# Patient Record
Sex: FEMALE | Race: WHITE | URBAN - METROPOLITAN AREA
[De-identification: names, ages, dates, MRNs, and addresses within clinical notes are randomized per-mention and may not be internally consistent; named-entity substitution may affect disease eponyms.]

---

## 2020-10-21 PROBLEM — Z00.00 ENCOUNTER FOR PREVENTIVE HEALTH EXAMINATION: Status: ACTIVE | Noted: 2020-10-21

## 2021-05-17 ENCOUNTER — EMERGENCY (EMERGENCY)
Facility: HOSPITAL | Age: 86
LOS: 1 days | Discharge: HOME | End: 2021-05-17
Admitting: EMERGENCY MEDICINE
Payer: MEDICARE

## 2021-05-17 VITALS
WEIGHT: 160.06 LBS | HEART RATE: 68 BPM | DIASTOLIC BLOOD PRESSURE: 75 MMHG | SYSTOLIC BLOOD PRESSURE: 134 MMHG | TEMPERATURE: 98 F | OXYGEN SATURATION: 99 % | RESPIRATION RATE: 18 BRPM

## 2021-05-17 VITALS
SYSTOLIC BLOOD PRESSURE: 128 MMHG | DIASTOLIC BLOOD PRESSURE: 78 MMHG | HEART RATE: 60 BPM | RESPIRATION RATE: 16 BRPM | OXYGEN SATURATION: 98 % | TEMPERATURE: 98 F

## 2021-05-17 DIAGNOSIS — S00.83XA CONTUSION OF OTHER PART OF HEAD, INITIAL ENCOUNTER: ICD-10-CM

## 2021-05-17 DIAGNOSIS — S05.10XA CONTUSION OF EYEBALL AND ORBITAL TISSUES, UNSPECIFIED EYE, INITIAL ENCOUNTER: ICD-10-CM
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
## 2021-05-17 DIAGNOSIS — H57.11 OCULAR PAIN, RIGHT EYE: ICD-10-CM

## 2021-05-17 DIAGNOSIS — S46.911A STRAIN OF UNSPECIFIED MUSCLE, FASCIA AND TENDON AT SHOULDER AND UPPER ARM LEVEL, RIGHT ARM, INITIAL ENCOUNTER: ICD-10-CM

## 2021-05-17 PROCEDURE — 70450 CT HEAD/BRAIN W/O DYE: CPT | Mod: 26,MH

## 2021-05-17 PROCEDURE — 99284 EMERGENCY DEPT VISIT MOD MDM: CPT

## 2021-05-17 PROCEDURE — 73030 X-RAY EXAM OF SHOULDER: CPT | Mod: 26,RT

## 2021-05-17 PROCEDURE — 70486 CT MAXILLOFACIAL W/O DYE: CPT | Mod: 26,MH

## 2021-05-17 RX ORDER — TETANUS TOXOID, REDUCED DIPHTHERIA TOXOID AND ACELLULAR PERTUSSIS VACCINE, ADSORBED 5; 2.5; 8; 8; 2.5 [IU]/.5ML; [IU]/.5ML; UG/.5ML; UG/.5ML; UG/.5ML
0.5 SUSPENSION INTRAMUSCULAR ONCE
Refills: 0 | Status: COMPLETED | OUTPATIENT
Start: 2021-05-17 | End: 2021-05-17

## 2021-05-17 RX ADMIN — TETANUS TOXOID, REDUCED DIPHTHERIA TOXOID AND ACELLULAR PERTUSSIS VACCINE, ADSORBED 0.5 MILLILITER(S): 5; 2.5; 8; 8; 2.5 SUSPENSION INTRAMUSCULAR at 19:11

## 2021-05-17 NOTE — ED PROVIDER NOTE - CARE PROVIDER_API CALL
Mane Royal)  Orthopaedic Surgery  [address], [address] [place] [address]  Phone: [phone]  Fax: [phone]  Follow Up Time: 1-3 Days

## 2021-05-17 NOTE — ED PROVIDER NOTE - OBJECTIVE STATEMENT
87 y/o female with PMH of Restless Leg Syndrome presents to the for evaluation after a mechanical slip and fall outside 3 days ago. She states she tripped over a curb and fell forward, striking her left eyebrow against the pavement. She states she sustained a wound to the eyebrow with localized pain and swelling, as well as pain to her right shoulder. She is unaware of any other associated injuries. Bystanders helped her to her feet, EMS arrived shortly afterward, and the patient declined transport to the ED stating she felt fine otherwise. She bandaged and cleaned her wound herself, and states it appears to be healing well, though has noticed some increased bruising to the area so she decided to come here for further evaluation. She states she has been ambulating well since the incident and has no other associated complaints at this time. She does not recall when her last Tdap was.    Denies LOC, dizziness, confusion, headache, vision changes, neck or back pain, CP, SOB, palpitations, abdo pain, N/V, extremity numbness or weakness

## 2021-05-17 NOTE — ED PROVIDER NOTE - SKIN WOUND TYPE
Superficial abrasion lateral to the left eyebrow which appears to be healing well. No active bleeding. No warmth, swelling, fluctuance, crepitus, streaking or discharge.

## 2021-05-17 NOTE — ED PROVIDER NOTE - NSFOLLOWUPINSTRUCTIONS_ED_ALL_ED_FT
Contusion    A contusion is a deep bruise. Contusions are the result of a blunt injury to tissues and muscle fibers under the skin. The skin overlying the contusion may turn blue, purple, or yellow. Symptoms also include pain and swelling in the injured area.    Strain    A strain is a stretch or tear in one of the muscles in your body. This is caused by an injury to the area such as a twisting mechanism. Symptoms include pain, swelling, or bruising. Rest that area over the next several days and slowly resume activity when tolerated. Ice can help with swelling and pain.     RETURN TO THE ER IMMEDIATELY IF YOU HAVE ANY OF THE FOLLOWING SYMPTOMS: severe pain, numbness, tingling, pain, weakness, or skin color/temperature change in any part of your body distal to the injury.

## 2021-05-17 NOTE — ED PROVIDER NOTE - PATIENT PORTAL LINK FT
You can access the FollowMyHealth Patient Portal offered by [facility] by registering at the following website: [email or web address]. By joining [facility]’s FollowMyHealth portal, you will also be able to view your health information using other applications (apps) compatible with our system.

## 2021-05-17 NOTE — ED PROVIDER NOTE - ENMT, MLM
There is minor swelling with localized ecchymosis and tenderness just lateral to the left eyebrow on the face (See SKIN). Scalp is otherwise NCAT.

## 2021-05-17 NOTE — ED ADULT NURSE NOTE - NSIMPLEMENTINTERV_GEN_ALL_ED
Implemented All Fall with Harm Risk Interventions:  [place] to call system. Call bell, personal items and telephone within reach. Instruct patient to call for assistance. Room bathroom lighting operational. Non-slip footwear when patient is off stretcher. Physically safe environment: no spills, clutter or unnecessary equipment. Stretcher in lowest position, wheels locked, appropriate side rails in place. Provide visual cue, wrist band, yellow gown, etc. Monitor gait and stability. Monitor for mental status changes and reorient to person, place, and time. Review medications for side effects contributing to fall risk. Reinforce activity limits and safety measures with patient and family. Provide visual clues: red socks.

## 2021-05-17 NOTE — ED PROVIDER NOTE - MUSCULOSKELETAL, MLM
Painful ROM of the right shoulder. Non-tender. No obvious swelling, deformity or palpable crepitus. There is no midline tenderness, deformities or step-offs of the spine. Patient ambulates well and without difficulty.

## 2021-05-17 NOTE — ED PROVIDER NOTE - CLINICAL SUMMARY MEDICAL DECISION MAKING FREE TEXT BOX
CT Head, Maxillofacial and Xrays of the shoulder were reviewed with no fracture, dislocation or acute intracranial traumatic findings. Pt is sitting comfortably in NAD with no other complaints at this time. Patient requesting Ortho referral in this building as she lives close by if her shoulder pain continues. Will D/C with supportive care instructions and to F/U with Ortho this week for re-evaluation. Strict return precautions reviewed with pt in which pt verbalizes understanding and agrees to.

## 2025-07-07 ENCOUNTER — INPATIENT (INPATIENT)
Facility: HOSPITAL | Age: 89
LOS: 3 days | Discharge: NURSING, REHAB OR LONG-TERM CARE | End: 2025-07-11
Attending: STUDENT IN AN ORGANIZED HEALTH CARE EDUCATION/TRAINING PROGRAM | Admitting: INTERNAL MEDICINE
Payer: MEDICARE

## 2025-07-07 VITALS
TEMPERATURE: 98 F | OXYGEN SATURATION: 96 % | RESPIRATION RATE: 16 BRPM | WEIGHT: 154.32 LBS | DIASTOLIC BLOOD PRESSURE: 52 MMHG | HEART RATE: 77 BPM | HEIGHT: 64 IN | SYSTOLIC BLOOD PRESSURE: 98 MMHG

## 2025-07-07 LAB
ALBUMIN SERPL ELPH-MCNC: 3.4 G/DL — SIGNIFICANT CHANGE UP (ref 3.4–5)
ALP SERPL-CCNC: 91 U/L — SIGNIFICANT CHANGE UP (ref 40–120)
ALT FLD-CCNC: 23 U/L — SIGNIFICANT CHANGE UP (ref 12–42)
ANION GAP SERPL CALC-SCNC: 5 MMOL/L — LOW (ref 9–16)
AST SERPL-CCNC: 28 U/L — SIGNIFICANT CHANGE UP (ref 15–37)
BASOPHILS # BLD AUTO: 0.02 K/UL — SIGNIFICANT CHANGE UP (ref 0–0.2)
BASOPHILS NFR BLD AUTO: 0.2 % — SIGNIFICANT CHANGE UP (ref 0–2)
BILIRUB SERPL-MCNC: 0.8 MG/DL — SIGNIFICANT CHANGE UP (ref 0.2–1.2)
BUN SERPL-MCNC: 17 MG/DL — SIGNIFICANT CHANGE UP (ref 7–23)
CALCIUM SERPL-MCNC: 9 MG/DL — SIGNIFICANT CHANGE UP (ref 8.5–10.5)
CHLORIDE SERPL-SCNC: 96 MMOL/L — SIGNIFICANT CHANGE UP (ref 96–108)
CO2 SERPL-SCNC: 33 MMOL/L — HIGH (ref 22–31)
CREAT SERPL-MCNC: 0.65 MG/DL — SIGNIFICANT CHANGE UP (ref 0.5–1.3)
EGFR: 84 ML/MIN/1.73M2 — SIGNIFICANT CHANGE UP
EGFR: 84 ML/MIN/1.73M2 — SIGNIFICANT CHANGE UP
EOSINOPHIL # BLD AUTO: 0.16 K/UL — SIGNIFICANT CHANGE UP (ref 0–0.5)
EOSINOPHIL NFR BLD AUTO: 1.5 % — SIGNIFICANT CHANGE UP (ref 0–6)
GLUCOSE SERPL-MCNC: 171 MG/DL — HIGH (ref 70–99)
HCT VFR BLD CALC: 37.4 % — SIGNIFICANT CHANGE UP (ref 34.5–45)
HGB BLD-MCNC: 12 G/DL — SIGNIFICANT CHANGE UP (ref 11.5–15.5)
IMM GRANULOCYTES # BLD AUTO: 0.06 K/UL — SIGNIFICANT CHANGE UP (ref 0–0.07)
IMM GRANULOCYTES NFR BLD AUTO: 0.6 % — SIGNIFICANT CHANGE UP (ref 0–0.9)
LACTATE BLDV-MCNC: 1.4 MMOL/L — SIGNIFICANT CHANGE UP (ref 0.5–2)
LYMPHOCYTES # BLD AUTO: 0.54 K/UL — LOW (ref 1–3.3)
LYMPHOCYTES NFR BLD AUTO: 5.1 % — LOW (ref 13–44)
MCHC RBC-ENTMCNC: 27.8 PG — SIGNIFICANT CHANGE UP (ref 27–34)
MCHC RBC-ENTMCNC: 32.1 G/DL — SIGNIFICANT CHANGE UP (ref 32–36)
MCV RBC AUTO: 86.6 FL — SIGNIFICANT CHANGE UP (ref 80–100)
MONOCYTES # BLD AUTO: 1.11 K/UL — HIGH (ref 0–0.9)
MONOCYTES NFR BLD AUTO: 10.5 % — SIGNIFICANT CHANGE UP (ref 2–14)
NEUTROPHILS # BLD AUTO: 8.7 K/UL — HIGH (ref 1.8–7.4)
NEUTROPHILS NFR BLD AUTO: 82.1 % — HIGH (ref 43–77)
NRBC # BLD AUTO: 0 K/UL — SIGNIFICANT CHANGE UP (ref 0–0)
NRBC # FLD: 0 K/UL — SIGNIFICANT CHANGE UP (ref 0–0)
NRBC BLD AUTO-RTO: 0 /100 WBCS — SIGNIFICANT CHANGE UP (ref 0–0)
PCO2 BLDV: 55 MMHG — HIGH (ref 39–42)
PH BLDV: 7.39 — SIGNIFICANT CHANGE UP (ref 7.32–7.43)
PLATELET # BLD AUTO: 230 K/UL — SIGNIFICANT CHANGE UP (ref 150–400)
PMV BLD: 10.7 FL — SIGNIFICANT CHANGE UP (ref 7–13)
PO2 BLDV: <35 MMHG — LOW (ref 25–45)
POTASSIUM SERPL-MCNC: 4.3 MMOL/L — SIGNIFICANT CHANGE UP (ref 3.5–5.3)
POTASSIUM SERPL-SCNC: 4.3 MMOL/L — SIGNIFICANT CHANGE UP (ref 3.5–5.3)
PROT SERPL-MCNC: 8.1 G/DL — SIGNIFICANT CHANGE UP (ref 6.4–8.2)
RBC # BLD: 4.32 M/UL — SIGNIFICANT CHANGE UP (ref 3.8–5.2)
RBC # FLD: 15.6 % — HIGH (ref 10.3–14.5)
SAO2 % BLDV: 29.6 % — LOW (ref 67–88)
SODIUM SERPL-SCNC: 134 MMOL/L — SIGNIFICANT CHANGE UP (ref 132–145)
WBC # BLD: 10.59 K/UL — HIGH (ref 3.8–10.5)
WBC # FLD AUTO: 10.59 K/UL — HIGH (ref 3.8–10.5)

## 2025-07-07 PROCEDURE — 99285 EMERGENCY DEPT VISIT HI MDM: CPT | Mod: FS

## 2025-07-07 PROCEDURE — 73701 CT LOWER EXTREMITY W/DYE: CPT | Mod: 26,LT

## 2025-07-07 RX ORDER — VANCOMYCIN HCL IN 5 % DEXTROSE 1.5G/250ML
1000 PLASTIC BAG, INJECTION (ML) INTRAVENOUS ONCE
Refills: 0 | Status: COMPLETED | OUTPATIENT
Start: 2025-07-07 | End: 2025-07-07

## 2025-07-07 RX ORDER — PIPERACILLIN-TAZO-DEXTROSE,ISO 3.375G/5
3.38 IV SOLUTION, PIGGYBACK PREMIX FROZEN(ML) INTRAVENOUS ONCE
Refills: 0 | Status: COMPLETED | OUTPATIENT
Start: 2025-07-07 | End: 2025-07-07

## 2025-07-07 RX ADMIN — Medication 500 MILLILITER(S): at 21:48

## 2025-07-07 RX ADMIN — Medication 200 GRAM(S): at 22:55

## 2025-07-07 RX ADMIN — Medication 250 MILLIGRAM(S): at 19:47

## 2025-07-07 NOTE — ED ADULT TRIAGE NOTE - CHIEF COMPLAINT QUOTE
pt here left great toe pain; as per HHA been bleeding for 2 days after banging nail against table; dried blood noted; city MD concerned for maggots and infection

## 2025-07-07 NOTE — ED PROVIDER NOTE - ATTENDING APP SHARED VISIT CONTRIBUTION OF CARE
I personally examined the patient and discussed plan of care with JUSTINA including diagnostic testing, treatment, and disposition.

## 2025-07-07 NOTE — ED PROVIDER NOTE - CLINICAL SUMMARY MEDICAL DECISION MAKING FREE TEXT BOX
89yo F with DM, HLD, s/p pacemaker, diabetic neuropathy, c/o infection to left big toe with maggot on it for at least a month. Pt states that she is not able to walk as she has decreased feeling to her feet, and arthritis to both knees. Pt does not remember when the trauma happened. Denies fever.  Exam found pt sitting in chair, left 1st toe has maggot underneath toenail; erythema and edema on bot feet till mid tib/fib.  concern for cellulitis. will get CT foot to mid tib/fib to r/o osteomyelitis, abscess although low suspicion. start vancomycin and fluids.

## 2025-07-07 NOTE — ED ADULT NURSE REASSESSMENT NOTE - NS ED NURSE REASSESS COMMENT FT1
received patient from previous RN. patient resting comfortably in stretcher, no signs of distress noted. pending admission

## 2025-07-07 NOTE — ED PROVIDER NOTE - PHYSICAL EXAMINATION
CONSTITUTIONAL: Well-appearing  NEURO: Alert & oriented. Sensory and motor functions are grossly intact.  PSYCH: Mood appropriate. Thought processes intact.   NECK: Supple  CARD: Regular rate and rhythm, no murmurs  RESP: No accessory muscle use; breath sounds clear and equal bilaterally; no wheezes, rhonchi, or rales     ABD: Soft; non-distended; non-tender.   MUSCULOSKELETAL/EXTREMITIES: left 1st toe has maggot underneath toenail; erythema and edema on bot feet till mid tib/fib.

## 2025-07-07 NOTE — ED PROVIDER NOTE - OBJECTIVE STATEMENT
89yo F with DM, HLD, s/p pacemaker, diabetic neuropathy, c/o infection to left big toe with maggot on it for at least a month. Pt states that she is not able to walk as she has decreased feeling to her feet, and arthritis to both knees. Pt does not remember when the trauma happened. Denies fever. 89yo F with DM, HLD, s/p pacemaker, diabetic neuropathy, c/o infection to left big toe with maggot on it for at least a month. Pt states that she is not able to walk as she has decreased feeling to her feet, and arthritis to both knees. Pt does not remember when the trauma happened. Denies fever. Pt has HHA coverage for 8 hours per day and also pays for other hours. Per aid/friend who accompanied pt to the ER, she does not know when this condition starts as she goes to visit pt once a week; she usually takes over from other aid who sees her once a week as well.

## 2025-07-08 DIAGNOSIS — N32.81 OVERACTIVE BLADDER: ICD-10-CM

## 2025-07-08 DIAGNOSIS — Z95.0 PRESENCE OF CARDIAC PACEMAKER: Chronic | ICD-10-CM

## 2025-07-08 DIAGNOSIS — L03.032 CELLULITIS OF LEFT TOE: ICD-10-CM
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
## 2025-07-08 DIAGNOSIS — E78.5 HYPERLIPIDEMIA, UNSPECIFIED: ICD-10-CM

## 2025-07-08 DIAGNOSIS — Z29.9 ENCOUNTER FOR PROPHYLACTIC MEASURES, UNSPECIFIED: ICD-10-CM

## 2025-07-08 DIAGNOSIS — E11.9 TYPE 2 DIABETES MELLITUS WITHOUT COMPLICATIONS: ICD-10-CM

## 2025-07-08 DIAGNOSIS — F32.9 MAJOR DEPRESSIVE DISORDER, SINGLE EPISODE, UNSPECIFIED: ICD-10-CM

## 2025-07-08 DIAGNOSIS — G62.9 POLYNEUROPATHY, UNSPECIFIED: ICD-10-CM

## 2025-07-08 DIAGNOSIS — B87.9 MYIASIS, UNSPECIFIED: ICD-10-CM

## 2025-07-08 PROBLEM — G25.81 RESTLESS LEGS SYNDROME: Chronic | Status: ACTIVE | Noted: 2021-05-17

## 2025-07-08 LAB
ADD ON TEST-SPECIMEN IN LAB: SIGNIFICANT CHANGE UP
ANION GAP SERPL CALC-SCNC: 10 MMOL/L — SIGNIFICANT CHANGE UP (ref 5–17)
APPEARANCE UR: CLEAR — SIGNIFICANT CHANGE UP
BASOPHILS # BLD AUTO: 0.02 K/UL — SIGNIFICANT CHANGE UP (ref 0–0.2)
BASOPHILS NFR BLD AUTO: 0.2 % — SIGNIFICANT CHANGE UP (ref 0–2)
BILIRUB UR-MCNC: NEGATIVE — SIGNIFICANT CHANGE UP
BUN SERPL-MCNC: 12 MG/DL — SIGNIFICANT CHANGE UP (ref 7–23)
CALCIUM SERPL-MCNC: 8.7 MG/DL — SIGNIFICANT CHANGE UP (ref 8.4–10.5)
CHLORIDE SERPL-SCNC: 98 MMOL/L — SIGNIFICANT CHANGE UP (ref 96–108)
CO2 SERPL-SCNC: 24 MMOL/L — SIGNIFICANT CHANGE UP (ref 22–31)
COLOR SPEC: YELLOW — SIGNIFICANT CHANGE UP
CREAT SERPL-MCNC: 0.46 MG/DL — LOW (ref 0.5–1.3)
CRP SERPL-MCNC: 29.1 MG/L — HIGH (ref 0–4)
DIFF PNL FLD: NEGATIVE — SIGNIFICANT CHANGE UP
E COLI DNA BLD POS QL NAA+NON-PROBE: SIGNIFICANT CHANGE UP
EGFR: 91 ML/MIN/1.73M2 — SIGNIFICANT CHANGE UP
EGFR: 91 ML/MIN/1.73M2 — SIGNIFICANT CHANGE UP
EOSINOPHIL # BLD AUTO: 0.06 K/UL — SIGNIFICANT CHANGE UP (ref 0–0.5)
EOSINOPHIL NFR BLD AUTO: 0.6 % — SIGNIFICANT CHANGE UP (ref 0–6)
ERYTHROCYTE [SEDIMENTATION RATE] IN BLOOD: 41 MM/HR — HIGH (ref 0–20)
GLUCOSE SERPL-MCNC: 116 MG/DL — HIGH (ref 70–99)
GLUCOSE UR QL: NEGATIVE MG/DL — SIGNIFICANT CHANGE UP
GRAM STN FLD: ABNORMAL
GRAM STN FLD: SIGNIFICANT CHANGE UP
HCT VFR BLD CALC: 33 % — LOW (ref 34.5–45)
HGB BLD-MCNC: 10.6 G/DL — LOW (ref 11.5–15.5)
IMM GRANULOCYTES # BLD AUTO: 0.05 K/UL — SIGNIFICANT CHANGE UP (ref 0–0.07)
IMM GRANULOCYTES NFR BLD AUTO: 0.5 % — SIGNIFICANT CHANGE UP (ref 0–0.9)
KETONES UR QL: NEGATIVE MG/DL — SIGNIFICANT CHANGE UP
LEUKOCYTE ESTERASE UR-ACNC: NEGATIVE — SIGNIFICANT CHANGE UP
LYMPHOCYTES # BLD AUTO: 0.78 K/UL — LOW (ref 1–3.3)
LYMPHOCYTES NFR BLD AUTO: 7.4 % — LOW (ref 13–44)
MAGNESIUM SERPL-MCNC: 1.9 MG/DL — SIGNIFICANT CHANGE UP (ref 1.6–2.6)
MCHC RBC-ENTMCNC: 28.1 PG — SIGNIFICANT CHANGE UP (ref 27–34)
MCHC RBC-ENTMCNC: 32.1 G/DL — SIGNIFICANT CHANGE UP (ref 32–36)
MCV RBC AUTO: 87.5 FL — SIGNIFICANT CHANGE UP (ref 80–100)
METHOD TYPE: SIGNIFICANT CHANGE UP
MONOCYTES # BLD AUTO: 1.31 K/UL — HIGH (ref 0–0.9)
MONOCYTES NFR BLD AUTO: 12.5 % — SIGNIFICANT CHANGE UP (ref 2–14)
NEUTROPHILS # BLD AUTO: 8.25 K/UL — HIGH (ref 1.8–7.4)
NEUTROPHILS NFR BLD AUTO: 78.8 % — HIGH (ref 43–77)
NITRITE UR-MCNC: NEGATIVE — SIGNIFICANT CHANGE UP
NRBC # BLD AUTO: 0 K/UL — SIGNIFICANT CHANGE UP (ref 0–0)
NRBC # FLD: 0 K/UL — SIGNIFICANT CHANGE UP (ref 0–0)
NRBC BLD AUTO-RTO: 0 /100 WBCS — SIGNIFICANT CHANGE UP (ref 0–0)
PH UR: 7 — SIGNIFICANT CHANGE UP (ref 5–8)
PLATELET # BLD AUTO: 223 K/UL — SIGNIFICANT CHANGE UP (ref 150–400)
PMV BLD: 11.4 FL — SIGNIFICANT CHANGE UP (ref 7–13)
POTASSIUM SERPL-MCNC: 4.1 MMOL/L — SIGNIFICANT CHANGE UP (ref 3.5–5.3)
POTASSIUM SERPL-SCNC: 4.1 MMOL/L — SIGNIFICANT CHANGE UP (ref 3.5–5.3)
PROT UR-MCNC: NEGATIVE MG/DL — SIGNIFICANT CHANGE UP
RBC # BLD: 3.77 M/UL — LOW (ref 3.8–5.2)
RBC # FLD: 15.8 % — HIGH (ref 10.3–14.5)
SODIUM SERPL-SCNC: 132 MMOL/L — LOW (ref 135–145)
SP GR SPEC: 1.02 — SIGNIFICANT CHANGE UP (ref 1–1.03)
SPECIMEN SOURCE: SIGNIFICANT CHANGE UP
SPECIMEN SOURCE: SIGNIFICANT CHANGE UP
UROBILINOGEN FLD QL: 2 MG/DL (ref 0.2–1)
WBC # BLD: 10.47 K/UL — SIGNIFICANT CHANGE UP (ref 3.8–10.5)
WBC # FLD AUTO: 10.47 K/UL — SIGNIFICANT CHANGE UP (ref 3.8–10.5)

## 2025-07-08 PROCEDURE — 82803 BLOOD GASES ANY COMBINATION: CPT

## 2025-07-08 PROCEDURE — 87150 DNA/RNA AMPLIFIED PROBE: CPT

## 2025-07-08 PROCEDURE — 36415 COLL VENOUS BLD VENIPUNCTURE: CPT

## 2025-07-08 PROCEDURE — 82962 GLUCOSE BLOOD TEST: CPT

## 2025-07-08 PROCEDURE — 83735 ASSAY OF MAGNESIUM: CPT

## 2025-07-08 PROCEDURE — 83605 ASSAY OF LACTIC ACID: CPT

## 2025-07-08 PROCEDURE — 81003 URINALYSIS AUTO W/O SCOPE: CPT

## 2025-07-08 PROCEDURE — 87205 SMEAR GRAM STAIN: CPT

## 2025-07-08 PROCEDURE — 84145 PROCALCITONIN (PCT): CPT

## 2025-07-08 PROCEDURE — 85025 COMPLETE CBC W/AUTO DIFF WBC: CPT

## 2025-07-08 PROCEDURE — 99223 1ST HOSP IP/OBS HIGH 75: CPT | Mod: GC

## 2025-07-08 PROCEDURE — 87040 BLOOD CULTURE FOR BACTERIA: CPT

## 2025-07-08 PROCEDURE — 73070 X-RAY EXAM OF ELBOW: CPT | Mod: 26,RT

## 2025-07-08 PROCEDURE — 86140 C-REACTIVE PROTEIN: CPT

## 2025-07-08 PROCEDURE — 87075 CULTR BACTERIA EXCEPT BLOOD: CPT

## 2025-07-08 PROCEDURE — 85652 RBC SED RATE AUTOMATED: CPT

## 2025-07-08 PROCEDURE — 73030 X-RAY EXAM OF SHOULDER: CPT | Mod: 26,RT

## 2025-07-08 PROCEDURE — 80048 BASIC METABOLIC PNL TOTAL CA: CPT

## 2025-07-08 PROCEDURE — 87070 CULTURE OTHR SPECIMN AEROBIC: CPT

## 2025-07-08 PROCEDURE — 80053 COMPREHEN METABOLIC PANEL: CPT

## 2025-07-08 RX ORDER — B1/B2/B3/B5/B6/B12/VIT C/FOLIC 500-0.5 MG
1 TABLET ORAL DAILY
Refills: 0 | Status: DISCONTINUED | OUTPATIENT
Start: 2025-07-08 | End: 2025-07-11

## 2025-07-08 RX ORDER — CEFAZOLIN SODIUM IN 0.9 % NACL 3 G/100 ML
2000 INTRAVENOUS SOLUTION, PIGGYBACK (ML) INTRAVENOUS EVERY 8 HOURS
Refills: 0 | Status: DISCONTINUED | OUTPATIENT
Start: 2025-07-08 | End: 2025-07-08

## 2025-07-08 RX ORDER — GABAPENTIN 400 MG/1
1 CAPSULE ORAL
Refills: 0 | DISCHARGE

## 2025-07-08 RX ORDER — REPAGLINIDE 1 MG/1
1 TABLET ORAL
Refills: 0 | DISCHARGE

## 2025-07-08 RX ORDER — SENNA 187 MG
2 TABLET ORAL AT BEDTIME
Refills: 0 | Status: DISCONTINUED | OUTPATIENT
Start: 2025-07-08 | End: 2025-07-11

## 2025-07-08 RX ORDER — INSULIN LISPRO 100 U/ML
INJECTION, SOLUTION INTRAVENOUS; SUBCUTANEOUS
Refills: 0 | Status: DISCONTINUED | OUTPATIENT
Start: 2025-07-08 | End: 2025-07-11

## 2025-07-08 RX ORDER — PIPERACILLIN-TAZO-DEXTROSE,ISO 3.375G/5
3.38 IV SOLUTION, PIGGYBACK PREMIX FROZEN(ML) INTRAVENOUS EVERY 8 HOURS
Refills: 0 | Status: DISCONTINUED | OUTPATIENT
Start: 2025-07-08 | End: 2025-07-10

## 2025-07-08 RX ORDER — MIRABEGRON 50 MG/1
1 TABLET, FILM COATED, EXTENDED RELEASE ORAL
Refills: 0 | DISCHARGE

## 2025-07-08 RX ORDER — GABAPENTIN 400 MG/1
100 CAPSULE ORAL DAILY
Refills: 0 | Status: DISCONTINUED | OUTPATIENT
Start: 2025-07-08 | End: 2025-07-11

## 2025-07-08 RX ORDER — ATORVASTATIN CALCIUM 80 MG/1
10 TABLET, FILM COATED ORAL AT BEDTIME
Refills: 0 | Status: DISCONTINUED | OUTPATIENT
Start: 2025-07-08 | End: 2025-07-11

## 2025-07-08 RX ORDER — POLYETHYLENE GLYCOL 3350 17 G/17G
17 POWDER, FOR SOLUTION ORAL EVERY 12 HOURS
Refills: 0 | Status: DISCONTINUED | OUTPATIENT
Start: 2025-07-08 | End: 2025-07-11

## 2025-07-08 RX ORDER — ESCITALOPRAM OXALATE 20 MG/1
20 TABLET ORAL DAILY
Refills: 0 | Status: DISCONTINUED | OUTPATIENT
Start: 2025-07-08 | End: 2025-07-11

## 2025-07-08 RX ORDER — ENOXAPARIN SODIUM 100 MG/ML
40 INJECTION SUBCUTANEOUS EVERY 24 HOURS
Refills: 0 | Status: DISCONTINUED | OUTPATIENT
Start: 2025-07-08 | End: 2025-07-11

## 2025-07-08 RX ORDER — NYSTATIN 100000 [USP'U]/G
1 CREAM TOPICAL
Refills: 0 | Status: DISCONTINUED | OUTPATIENT
Start: 2025-07-08 | End: 2025-07-11

## 2025-07-08 RX ORDER — GABAPENTIN 400 MG/1
200 CAPSULE ORAL
Refills: 0 | Status: DISCONTINUED | OUTPATIENT
Start: 2025-07-08 | End: 2025-07-11

## 2025-07-08 RX ORDER — ESCITALOPRAM OXALATE 20 MG/1
1 TABLET ORAL
Refills: 0 | DISCHARGE

## 2025-07-08 RX ADMIN — ATORVASTATIN CALCIUM 10 MILLIGRAM(S): 80 TABLET, FILM COATED ORAL at 21:49

## 2025-07-08 RX ADMIN — INSULIN LISPRO 2: 100 INJECTION, SOLUTION INTRAVENOUS; SUBCUTANEOUS at 22:35

## 2025-07-08 RX ADMIN — GABAPENTIN 100 MILLIGRAM(S): 400 CAPSULE ORAL at 13:03

## 2025-07-08 RX ADMIN — GABAPENTIN 200 MILLIGRAM(S): 400 CAPSULE ORAL at 18:31

## 2025-07-08 RX ADMIN — Medication 1 TABLET(S): at 13:04

## 2025-07-08 RX ADMIN — NYSTATIN 1 APPLICATION(S): 100000 CREAM TOPICAL at 06:35

## 2025-07-08 RX ADMIN — Medication 2 TABLET(S): at 21:49

## 2025-07-08 RX ADMIN — ESCITALOPRAM OXALATE 20 MILLIGRAM(S): 20 TABLET ORAL at 06:36

## 2025-07-08 RX ADMIN — POLYETHYLENE GLYCOL 3350 17 GRAM(S): 17 POWDER, FOR SOLUTION ORAL at 06:35

## 2025-07-08 RX ADMIN — INSULIN LISPRO 2: 100 INJECTION, SOLUTION INTRAVENOUS; SUBCUTANEOUS at 18:30

## 2025-07-08 RX ADMIN — NYSTATIN 1 APPLICATION(S): 100000 CREAM TOPICAL at 18:32

## 2025-07-08 RX ADMIN — Medication 1 TABLET(S): at 06:35

## 2025-07-08 RX ADMIN — ESCITALOPRAM OXALATE 20 MILLIGRAM(S): 20 TABLET ORAL at 15:41

## 2025-07-08 RX ADMIN — ENOXAPARIN SODIUM 40 MILLIGRAM(S): 100 INJECTION SUBCUTANEOUS at 06:37

## 2025-07-08 RX ADMIN — GABAPENTIN 200 MILLIGRAM(S): 400 CAPSULE ORAL at 06:35

## 2025-07-08 RX ADMIN — Medication 100 MILLIGRAM(S): at 06:38

## 2025-07-08 RX ADMIN — Medication 25 GRAM(S): at 17:02

## 2025-07-08 NOTE — H&P ADULT - PROBLEM SELECTOR PLAN 9
Fluids: S/P 500cc NS, encourage PO  Electrolytes: Replete PRN  Diet: CC  DVT: Lovenox  Dispo: [facility]  Code: DNR/DNI

## 2025-07-08 NOTE — PHYSICAL THERAPY INITIAL EVALUATION ADULT - PERTINENT HX OF CURRENT PROBLEM, REHAB EVAL
89yo F with DM, depression, HLD, s/p pacemaker, diabetic neuropathy presented to the ED with a chronic left great toe wound infested with maggots, present for at least one month. Also reported a recent fall with right arm pain and decreased range of motion, though prior outpatient imaging showed no fracture or dislocation.

## 2025-07-08 NOTE — H&P ADULT - PROBLEM SELECTOR PLAN 3
Pt reporting trip and fall 1 week ago at home, denies headstrike/LOC. Felt a pop in her R shoulder with decreased ROM following fall.  R shoulder XR from 5/17: No acute abnormality visualized. Moderate osteoarthritis.    -Repeat shoulder XR  -PT/OT

## 2025-07-08 NOTE — PROGRESS NOTE ADULT - SUBJECTIVE AND OBJECTIVE BOX
INTERVAL EVENTS: no significant overnight events.     SUBJECTIVE: No additional complaints or concerns reported by patient.   No cough, chest pain, fevers/chills, shortness of breath. ROS negative otherwise.     Vital Signs Last 24 Hrs  T(C): 36.9 (08 Jul 2025 03:33), Max: 37.6 (07 Jul 2025 18:34)  T(F): 98.4 (08 Jul 2025 03:33), Max: 99.6 (07 Jul 2025 18:34)  HR: 93 (08 Jul 2025 03:33) (77 - 99)  BP: 116/71 (08 Jul 2025 03:33) (98/52 - 119/67)  BP(mean): 86 (08 Jul 2025 03:33) (86 - 86)  RR: 18 (08 Jul 2025 03:33) (16 - 18)  SpO2: 94% (08 Jul 2025 03:33) (94% - 97%)    Parameters below as of 08 Jul 2025 03:33  Patient On (Oxygen Delivery Method): room air    Orthostatic VS  I&O's Summary      Exam  GENERAL/NEURO Awake, alert, NCAT, following commands, CN grossly intact  ENT: MMM, PERRL  CARDIOVASCULAR: RRR, no murmur  RESPIRATORY: No wheeze/rhonchi/crackles, no accessory muscle use  ABDOMEN: Abdomen soft, NT/ND, bowel sounds x4  SKIN/EXTREMETIES: No rashes, warm, no edema over lower extremeties. Able to move all 4 extremeties, DP intact, reflexes grossly normal.                          12.0   10.59 )-----------( 230      ( 07 Jul 2025 19:33 )             37.4   07-07    134  |  96  |  17  ----------------------------<  171[H]  4.3   |  33[H]  |  0.65    Ca    9.0      07 Jul 2025 19:33    TPro  8.1  /  Alb  3.4  /  TBili  0.8  /  DBili  x   /  AST  28  /  ALT  23  /  AlkPhos  91  07-07      Allergies    No Known Allergies    Intolerances     Home Medications:  escitalopram 20 mg oral tablet: 1 tab(s) orally once a day (08 Jul 2025 04:13)  Fluticasone:  (08 Jul 2025 04:13)  gabapentin 100 mg oral capsule: 1 cap(s) orally 3 times a day (08 Jul 2025 04:13)  metformin-sitagliptin:  (08 Jul 2025 04:13)  mirabegron 50 mg oral tablet, extended release: 1 tab(s) orally once a day (08 Jul 2025 04:13)  Repaglinide: 1 milligram(s) once a day (08 Jul 2025 04:13)  simvastatin 20 mg oral tablet: 1 tab(s) orally once a day (at bedtime) (08 Jul 2025 04:13)  MEDICATIONS  (STANDING):  atorvastatin 10 milliGRAM(s) Oral at bedtime  ceFAZolin   IVPB 2000 milliGRAM(s) IV Intermittent every 8 hours  enoxaparin Injectable 40 milliGRAM(s) SubCutaneous every 24 hours  escitalopram 20 milliGRAM(s) Oral daily  gabapentin 200 milliGRAM(s) Oral <User Schedule>  gabapentin 100 milliGRAM(s) Oral daily  insulin lispro (ADMELOG) corrective regimen sliding scale   SubCutaneous three times a day before meals  multivitamin 1 Tablet(s) Oral daily  nystatin Powder 1 Application(s) Topical two times a day  polyethylene glycol 3350 17 Gram(s) Oral every 12 hours  senna 2 Tablet(s) Oral at bedtime    MEDICATIONS  (PRN):  Activity - Ambulate with Assistance:     Time/Priority:  Routine (07-08-25 @ 04:46) [Active]  Diet, Consistent Carbohydrate/No Snacks (07-08-25 @ 04:46) [Active]       INTERVAL EVENTS: no significant overnight events.     SUBJECTIVE: Patient endorses getting some sleep. Denies pain. Reports being able to urinate without discomfort. She has not has a bowel movement since admission.    Vital Signs Last 24 Hrs  T(C): 36.9 (08 Jul 2025 03:33), Max: 37.6 (07 Jul 2025 18:34)  T(F): 98.4 (08 Jul 2025 03:33), Max: 99.6 (07 Jul 2025 18:34)  HR: 93 (08 Jul 2025 03:33) (77 - 99)  BP: 116/71 (08 Jul 2025 03:33) (98/52 - 119/67)  BP(mean): 86 (08 Jul 2025 03:33) (86 - 86)  RR: 18 (08 Jul 2025 03:33) (16 - 18)  SpO2: 94% (08 Jul 2025 03:33) (94% - 97%)      Exam  GENERAL/NEURO: Tired appearing, sitting in bed with slumped posture. Awake, alert, NCAT, following commands, CN grossly intact  ENT: MMM, PERRL  CARDIOVASCULAR: RRR, no murmur  RESPIRATORY: No wheeze/rhonchi/crackles, no accessory muscle use  ABDOMEN: Abdomen soft, NT/ND, +bowel sounds  SKIN/EXTREMETIES: Gauzed bandage of the left big toe. 3+ pitting edema of lower extremities bilaterally.                             10.6   10.47 )-----------( 223      ( 08 Jul 2025 05:30 )             33.0     07-08    132[L]  |  98  |  12  ----------------------------<  116[H]  4.1   |  24  |  0.46[L]    Ca    8.7      08 Jul 2025 05:30  Mg     1.9     07-08    TPro  8.1  /  Alb  3.4  /  TBili  0.8  /  DBili  x   /  AST  28  /  ALT  23  /  AlkPhos  91  07-07    POCT Blood Glucose (07.08.25 @ 12:53)   POCT Blood Glucose.: 144 mg/Rodri-Reactive Protein (07.08.25 @ 12:31)   C-Reactive Protein: 29.1 mg/LSedimentation Rate, Erythrocyte (07.08.25 @ 12:31)   Sedimentation Rate, Erythrocyte: 41 mm/hrPOCT Blood Glucose (07.08.25 @ 09:02)   POCT Blood Glucose.: 131: Notified RN mg/dLMagnesium in AM (07.08.25 @ 05:30)   Magnesium: 1.9: For pregnant women undergoing magnesium therapy (MgSO4), the therapeutic   range is 5.0-9.0 mg/dL. mg/dLBasic Metabolic Panel in AM (07.08.25 @ 05:30)   Sodium: 132 mmol/L  Potassium: 4.1 mmol/L  Chloride: 98 mmol/L  Carbon Dioxide: 24 mmol/L  Anion Gap: 10 mmol/L  Blood Urea Nitrogen: 12 mg/dL  Creatinine: 0.46 mg/dL  Glucose: 116 mg/dL  Calcium: 8.7 mg/dL  eGFR: 91Culture - Blood (07.07.25 @ 19:34)   Gram Stain:   Growth in aerobic bottle: Gram Negative Rods  Specimen Source: Blood Blood-Peripheral  Culture Results:   Growth in aerobic bottle: Gram Negative Rods   Direct identification is available within approximately 3-5   hours either by Blood Panel Multiplexed PCR or Direct   MALDI-TOF. Details: [email or web address]         Allergies    No Known Allergies    Intolerances     Home Medications:  escitalopram 20 mg oral tablet: 1 tab(s) orally once a day (08 Jul 2025 04:13)  Fluticasone:  (08 Jul 2025 04:13)  gabapentin 100 mg oral capsule: 1 cap(s) orally 3 times a day (08 Jul 2025 04:13)  metformin-sitagliptin:  (08 Jul 2025 04:13)  mirabegron 50 mg oral tablet, extended release: 1 tab(s) orally once a day (08 Jul 2025 04:13)  Repaglinide: 1 milligram(s) once a day (08 Jul 2025 04:13)  simvastatin 20 mg oral tablet: 1 tab(s) orally once a day (at bedtime) (08 Jul 2025 04:13)  MEDICATIONS  (STANDING):  atorvastatin 10 milliGRAM(s) Oral at bedtime  ceFAZolin   IVPB 2000 milliGRAM(s) IV Intermittent every 8 hours  enoxaparin Injectable 40 milliGRAM(s) SubCutaneous every 24 hours  escitalopram 20 milliGRAM(s) Oral daily  gabapentin 200 milliGRAM(s) Oral <User Schedule>  gabapentin 100 milliGRAM(s) Oral daily  insulin lispro (ADMELOG) corrective regimen sliding scale   SubCutaneous three times a day before meals  multivitamin 1 Tablet(s) Oral daily  nystatin Powder 1 Application(s) Topical two times a day  polyethylene glycol 3350 17 Gram(s) Oral every 12 hours  senna 2 Tablet(s) Oral at bedtime    MEDICATIONS  (PRN):  Activity - Ambulate with Assistance:     Time/Priority:  Routine (07-08-25 @ 04:46) [Active]  Diet, Consistent Carbohydrate/No Snacks (07-08-25 @ 04:46) [Active]       Hospital Course:   89yo F with DM, depression, HLD, s/p pacemaker, diabetic neuropathy, c/o infection to left big toe with maggot on it for at least a month. She is unsure when the wound first started. Noted to have L great toe nail avulsion with maggots present in wound. She lived alone up until 1 month ago, but has HHA now 10a-6p daily. Reports she has had increasing difficulty ambulating around her apartment, had a fall 1 wk ago and felt a pop in her right arm, has had decreased ROM since the fall. Reports she had outpatient imaging done without fx or dislocation. No headstrike/LOC. Otherwise denies any fevers, chills, N/V/D, abdominal pain, chest pain or SOB.   has received Vancomycine i89yo F with DM, depression, HLD, s/p pacemaker, diabetic neuropathy, c/o infection to left big toe with maggot on it for at least a month. She is unsure when the wound first started. Noted to have L great toe nail avulsion with maggots present in wound. She lived alone up until 1 month ago, but has HHA now 10a-6p daily. Reports she has had increasing difficulty ambulating around her apartment, had a fall 1 wk ago and felt a pop in her right arm, has had decreased ROM since the fall. Reports she had outpatient imaging done without fx or dislocation. No headstrike/LOC. Otherwise denies any fevers, chills, N/V/D, abdominal pain, chest pain or SOB.   Bx: E Coli. CT LLE with IV contrast: No evidence of osteomyelitis and she is currently on Zosyn, Nystatin powder.   INTERVAL EVENTS: no significant overnight events.     SUBJECTIVE: Patient endorses getting some sleep. Denies pain. Reports being able to urinate without discomfort. She has not has a bowel movement since admission.    Vital Signs Last 24 Hrs  T(C): 36.9 (08 Jul 2025 03:33), Max: 37.6 (07 Jul 2025 18:34)  T(F): 98.4 (08 Jul 2025 03:33), Max: 99.6 (07 Jul 2025 18:34)  HR: 93 (08 Jul 2025 03:33) (77 - 99)  BP: 116/71 (08 Jul 2025 03:33) (98/52 - 119/67)  BP(mean): 86 (08 Jul 2025 03:33) (86 - 86)  RR: 18 (08 Jul 2025 03:33) (16 - 18)  SpO2: 94% (08 Jul 2025 03:33) (94% - 97%)    Exam:  GENERAL/NEURO: Tired appearing, sitting in bed with slumped posture. Awake, alert, NCAT, following commands, CN grossly intact  ENT: MMM, PERRL  CARDIOVASCULAR: RRR, no murmur  RESPIRATORY: No wheeze/rhonchi/crackles, no accessory muscle use  ABDOMEN: Abdomen soft, NT/ND, +bowel sounds  SKIN/EXTREMETIES: Gauzed bandage of the left big toe. 3+ pitting edema of lower extremities bilaterally.                             10.6   10.47 )-----------( 223      ( 08 Jul 2025 05:30 )             33.0     07-08    132[L]  |  98  |  12  ----------------------------<  116[H]  4.1   |  24  |  0.46[L]    Ca    8.7      08 Jul 2025 05:30  Mg     1.9     07-08    TPro  8.1  /  Alb  3.4  /  TBili  0.8  /  DBili  x   /  AST  28  /  ALT  23  /  AlkPhos  91  07-07    POCT Blood Glucose (07.08.25 @ 12:53)   POCT Blood Glucose.: 144 mg/Rodri-Reactive Protein (07.08.25 @ 12:31)   C-Reactive Protein: 29.1 mg/LSedimentation Rate, Erythrocyte (07.08.25 @ 12:31)   Sedimentation Rate, Erythrocyte: 41 mm/hrPOCT Blood Glucose (07.08.25 @ 09:02)   POCT Blood Glucose.: 131: Notified RN mg/dLMagnesium in AM (07.08.25 @ 05:30)   Magnesium: 1.9: For pregnant women undergoing magnesium therapy (MgSO4), the therapeutic   range is 5.0-9.0 mg/dL. mg/dLBasic Metabolic Panel in AM (07.08.25 @ 05:30)   Sodium: 132 mmol/L  Potassium: 4.1 mmol/L  Chloride: 98 mmol/L  Carbon Dioxide: 24 mmol/L  Anion Gap: 10 mmol/L  Blood Urea Nitrogen: 12 mg/dL  Creatinine: 0.46 mg/dL  Glucose: 116 mg/dL  Calcium: 8.7 mg/dL  eGFR: 91Culture - Blood (07.07.25 @ 19:34)   Gram Stain:   Growth in aerobic bottle: Gram Negative Rods  Specimen Source: Blood Blood-Peripheral  Culture Results:   Growth in aerobic bottle: Gram Negative Rods   Direct identification is available within approximately 3-5   hours either by Blood Panel Multiplexed PCR or Direct   MALDI-TOF. Details: [email or web address]         Allergies    No Known Allergies    Intolerances    Home Medications:  escitalopram 20 mg oral tablet: 1 tab(s) orally once a day (08 Jul 2025 04:13)  Fluticasone:  (08 Jul 2025 04:13)  gabapentin 100 mg oral capsule: 1 cap(s) orally 3 times a day (08 Jul 2025 04:13)  metformin-sitagliptin:  (08 Jul 2025 04:13)  mirabegron 50 mg oral tablet, extended release: 1 tab(s) orally once a day (08 Jul 2025 04:13)  Repaglinide: 1 milligram(s) once a day (08 Jul 2025 04:13)  simvastatin 20 mg oral tablet: 1 tab(s) orally once a day (at bedtime) (08 Jul 2025 04:13)  MEDICATIONS  (STANDING):  atorvastatin 10 milliGRAM(s) Oral at bedtime  ceFAZolin   IVPB 2000 milliGRAM(s) IV Intermittent every 8 hours  enoxaparin Injectable 40 milliGRAM(s) SubCutaneous every 24 hours  escitalopram 20 milliGRAM(s) Oral daily  gabapentin 200 milliGRAM(s) Oral <User Schedule>  gabapentin 100 milliGRAM(s) Oral daily  insulin lispro (ADMELOG) corrective regimen sliding scale   SubCutaneous three times a day before meals  multivitamin 1 Tablet(s) Oral daily  nystatin Powder 1 Application(s) Topical two times a day  polyethylene glycol 3350 17 Gram(s) Oral every 12 hours  senna 2 Tablet(s) Oral at bedtime    MEDICATIONS  (PRN):  Activity - Ambulate with Assistance:     Time/Priority:  Routine (07-08-25 @ 04:46) [Active]  Diet, Consistent Carbohydrate/No Snacks (07-08-25 @ 04:46) [Active]

## 2025-07-08 NOTE — H&P ADULT - NSHPPHYSICALEXAM_GEN_ALL_CORE
.  VITAL SIGNS:  T(C): 36.9 (07-08-25 @ 03:33), Max: 37.6 (07-07-25 @ 18:34)  T(F): 98.4 (07-08-25 @ 03:33), Max: 99.6 (07-07-25 @ 18:34)  HR: 93 (07-08-25 @ 03:33) (77 - 99)  BP: 116/71 (07-08-25 @ 03:33) (98/52 - 119/67)  BP(mean): 86 (07-08-25 @ 03:33) (86 - 86)  RR: 18 (07-08-25 @ 03:33) (16 - 18)  SpO2: 94% (07-08-25 @ 03:33) (94% - 97%)  Wt(kg): --    PHYSICAL EXAM:    Constitutional: Resting comfortably in bed; NAD  Head: NC/AT  Eyes: EOMI, clear conjunctiva  ENT: MMM  Neck: supple  Respiratory: CTA B/L; no W/R/R  Cardiac: RRR  Gastrointestinal: soft, NT/ND; no rebound or guarding  Extremities: 3+ Pitting LE edema below knees. L great toe nail avulsion with maggots under nail. Trace erythema extending from L foot to mid shin. RLE appears well. DP pulses 2+ B/L. B/L knees with effusions, no warmth or erthema. Large ganglion cyst to L dorsal wrist. Decreased ROM of RUE in all planes 2/2 pain. Distally NVI  Neurologic: AAOx3; no focal deficits  Psychiatric: affect and characteristics of appearance, verbalizations, behaviors are appropriate

## 2025-07-08 NOTE — PROCEDURE NOTE - GENERAL PROCEDURE DETAILS
10cc of cardona block given to Hallux Left foot. hallux nail removed sterilely and maggots removed using hydrogen peroxide. site flushed and rinsed with saline. Dressed with DSD

## 2025-07-08 NOTE — H&P ADULT - HISTORY OF PRESENT ILLNESS
89yo F with DM, depression, HLD, s/p pacemaker, diabetic neuropathy, c/o infection to left big toe with maggot on it for at least a month. She is unsure when the wound first started. Noted to have L great toe nail avulsion with maggots present in wound. She lived alone up until 1 month ago, but has HHA now 10a-6p daily. Reports she has had increasing difficulty ambulating around her apartment, had a fall 1 wk ago and felt a pop in her right arm, has had decreased ROM since the fall. Reports she had outpatient imaging done without fx or dislocation. No headstrike/LOC. Otherwise denies any fevers, chills, N/V/D, abdominal pain, chest pain or SOB.     In the ED  Vitals: T 99.6 rectal, HR 99, /67, RR 16 O2 96% on RA  Labs: WBC 10.5, Hb 12, Plts 230, Na 134, K 4.3, CO2 33, Gap 5, Cr 0.65  Imaging: CT LLE with IV contrast: No evidence of osteomyelitis.  Moderate subcutaneous fat stranding/ill-defined fluid, edema and/or cellulitis. No soft tissue gas. No loculated fluid collection seen. Moderate knee joint effusion. Severe knee arthrosis.  Interventions: Zosyn 3.375mg x1, Vanco 1g x1, NS 500cc x1

## 2025-07-08 NOTE — CONSULT NOTE ADULT - ASSESSMENT
I M    89yo F with DM, depression, HLD, s/p pacemaker, diabetic neuropathy presented to the ED with a chronic left great toe wound infested with maggots, present for at least one month. Also reported a recent fall with right arm pain and decreased range of motion, though prior outpatient imaging showed no fracture or dislocation. Admitted for further management    Problem/Plan - 1:  ·  Problem: Cellulitis of great toe, left.   ·  Plan: Pt presenting wound to left great toe, present >1 month  Denies any recollection of injury to her toe, although notes fall 1 week ago  Afebrile in ED, HR 90's, WBC 10.5  CT RLE: No evidence of osteomyelitis.  Moderate subcutaneous fat stranding/ill-defined fluid, edema and/or cellulitis. No soft tissue gas. No loculated fluid collection seen.    -Start Cefazolin, low concern for MRSA risk factors (no recent hosp, coming from home, reports A1c last month 6.3)  -F/U blood cultures  -Podiatry consult in AM.    Problem/Plan - 2:  ·  Problem: Maggot infestation.   ·  Plan: As above.    Problem/Plan - 3:  ·  Problem: Fall.   ·  Plan: Pt reporting trip and fall 1 week ago at home, denies headstrike/LOC. Felt a pop in her R shoulder with decreased ROM following fall.  R shoulder XR from 5/17: No acute abnormality visualized. Moderate osteoarthritis.    -Repeat shoulder XR  -PT/OT.    Problem/Plan - 4:  ·  Problem: Diabetes.   ·  Plan: Reports last A1c 1 month ago 6.3, no record in HIE  Home meds: Repaglinide 1mg qd, Metformin-sitagliptan     -mISS  -A1c in AM.    Problem/Plan - 5:  ·  Problem: Hyperlipidemia.   ·  Plan: Home meds: Simvastatin 10mg qhs    -C/w home med.    Problem/Plan - 6:  ·  Problem: Neuropathy.   ·  Plan: #Restless leg syndrome  Home meds: Recently uptitrated gabapentin 200mg am, 100mg afternoon, 200mg qhs    -C/W home med.    Problem/Plan - 7:  ·  Problem: Overactive bladder.   ·  Plan: Home med mirabegron 50mg qd    -Non formulary, pt to bring in home med.    Problem/Plan - 8:  ·  Problem: Depression, major.   ·  Plan: Home med escitalopram 20mg qd    -C/W home med.    Problem/Plan - 9:  ·  Problem: Prophylactic measure.   ·  Plan: Fluids: S/P 500cc NS, encourage PO  Electrolytes: Replete PRN  Diet: CC  DVT: Lovenox  Dispo: [facility]  Code: DNR/DNI.

## 2025-07-08 NOTE — H&P ADULT - PROBLEM SELECTOR PLAN 4
Reports last A1c 1 month ago 6.3, no record in HIE  Home meds: Repaglinide 1mg qd, Metformin-sitagliptan     -mISS  -A1c in AM

## 2025-07-08 NOTE — CONSULT NOTE ADULT - SUBJECTIVE AND OBJECTIVE BOX
Attending:    Patient is a 89y old  Female who presents with a chief complaint of L great toe cellulitis (08 Jul 2025 07:54)      HPI:  89yo F with DM, depression, HLD, s/p pacemaker, diabetic neuropathy, c/o infection to left big toe with maggot on it for at least a month. She is unsure when the wound first started. Noted to have L great toe nail avulsion with maggots present in wound. She lived alone up until 1 month ago, but has HHA now 10a-6p daily. Reports she has had increasing difficulty ambulating around her apartment, had a fall 1 wk ago and felt a pop in her right arm, has had decreased ROM since the fall. Reports she had outpatient imaging done without fx or dislocation. No headstrike/LOC. Otherwise denies any fevers, chills, N/V/D, abdominal pain, chest pain or SOB.     Podiatry Addendum:  Podiatry was consulted for maggots present underneath L great toenail. Pt states that she fell about a week ago and could have sustained trauma to her L hallucal nail but she is unsure. Patient is unsure of when she first noticed the maggots underneath her L hallucal nail, thinks they have been present for about 1 month. Pt is worried about a toe infection due to the presence of maggots. Denies foot pain. Denies drainage. Denies fever, chills, SOB. Otherwise, no other pedal complaints to report.    In the ED  Vitals: T 99.6 rectal, HR 99, /67, RR 16 O2 96% on RA  Labs: WBC 10.5, Hb 12, Plts 230, Na 134, K 4.3, CO2 33, Gap 5, Cr 0.65  Imaging: CT LLE with IV contrast: No evidence of osteomyelitis.  Moderate subcutaneous fat stranding/ill-defined fluid, edema and/or cellulitis. No soft tissue gas. No loculated fluid collection seen. Moderate knee joint effusion. Severe knee arthrosis.  Interventions: Zosyn 3.375mg x1, Vanco 1g x1, NS 500cc x1 (08 Jul 2025 04:05)      Review of systems negative except per HPI and as stated below  General:	 no weakness; no fevers, no chills  Skin/Breast: no rash  Respiratory and Thorax: no SOB, no cough  Cardiovascular:	No chest pain  Gastrointestinal:	 no nausea, vomiting , diarrhea  Genitourinary:	no dysuria, no difficulty urinating, no hematuria  Musculoskeletal:	no weakness, no joint swelling/pain  Neurological:	no focal weakness/numbness  Endocrine:	no polyuria, no polydipsia    PAST MEDICAL & SURGICAL HISTORY:  Restless leg syndrome      Diabetes mellitus      S/P appendectomy      Pacemaker        Home Medications:  escitalopram 20 mg oral tablet: 1 tab(s) orally once a day (08 Jul 2025 04:13)  Fluticasone:  (08 Jul 2025 04:13)  gabapentin 100 mg oral capsule: 1 cap(s) orally 3 times a day (08 Jul 2025 04:13)  metformin-sitagliptin:  (08 Jul 2025 04:13)  mirabegron 50 mg oral tablet, extended release: 1 tab(s) orally once a day (08 Jul 2025 04:13)  Repaglinide: 1 milligram(s) once a day (08 Jul 2025 04:13)  simvastatin 20 mg oral tablet: 1 tab(s) orally once a day (at bedtime) (08 Jul 2025 04:13)    Allergies    No Known Allergies    Intolerances      FAMILY HISTORY:  No significant family history      Social History:       LABS                        10.6   10.47 )-----------( 223      ( 08 Jul 2025 05:30 )             33.0     07-08    132[L]  |  98  |  12  ----------------------------<  116[H]  4.1   |  24  |  0.46[L]    Ca    8.7      08 Jul 2025 05:30  Mg     1.9     07-08    TPro  8.1  /  Alb  3.4  /  TBili  0.8  /  DBili  x   /  AST  28  /  ALT  23  /  AlkPhos  91  07-07      ESR: 41  CRP: --  07-08 @ 12:31    Vital Signs Last 24 Hrs  T(C): 36.7 (08 Jul 2025 06:15), Max: 37.6 (07 Jul 2025 18:34)  T(F): 98 (08 Jul 2025 06:15), Max: 99.6 (07 Jul 2025 18:34)  HR: 95 (08 Jul 2025 06:15) (77 - 99)  BP: 120/75 (08 Jul 2025 06:15) (98/52 - 120/75)  BP(mean): 86 (08 Jul 2025 03:33) (86 - 86)  RR: 18 (08 Jul 2025 06:15) (16 - 18)  SpO2: 95% (08 Jul 2025 06:15) (94% - 97%)    Parameters below as of 08 Jul 2025 06:15  Patient On (Oxygen Delivery Method): room air        PHYSICAL EXAM  General: NAD, AA0x3    Lower Extremity Focused:  Vasc: 2+ DP/PT L. No edema. No erythema.  Derm: Nonadherence of distal aspect of L hallucal nail. Maggots present underneath L hallucal nail. No edema. No erythema. No drainage. (-) PTB. No malodor. No fluctuance.  Neuro: Diminished sensation L  MSK: (-) TTP to L hallucal nail    Gait Examination:    RADIOLOGY  < from: CT Lower Extremity w/ IV Cont, Left (07.07.25 @ 21:08) >  1.   No evidence of osteomyelitis.  Moderate subcutaneous fat  stranding/ill-defined fluid, edema and/or cellulitis. No soft tissue gas.   No  loculated fluid collection seen.  2.   Moderate knee joint effusion. Severe knee osteoarthritis.    < end of copied text >                      
  Patient is a 89y old  Female who presents with a chief complaint of L great toe cellulitis (08 Jul 2025 06:23)      HPI:  89yo F with DM, depression, HLD, s/p pacemaker, diabetic neuropathy, c/o infection to left big toe with maggot on it for at least a month. She is unsure when the wound first started. Noted to have L great toe nail avulsion with maggots present in wound. She lived alone up until 1 month ago, but has HHA now 10a-6p daily. Reports she has had increasing difficulty ambulating around her apartment, had a fall 1 wk ago and felt a pop in her right arm, has had decreased ROM since the fall. Reports she had outpatient imaging done without fx or dislocation. No headstrike/LOC. Otherwise denies any fevers, chills, N/V/D, abdominal pain, chest pain or SOB.     In the ED  Vitals: T 99.6 rectal, HR 99, /67, RR 16 O2 96% on RA  Labs: WBC 10.5, Hb 12, Plts 230, Na 134, K 4.3, CO2 33, Gap 5, Cr 0.65  Imaging: CT LLE with IV contrast: No evidence of osteomyelitis.  Moderate subcutaneous fat stranding/ill-defined fluid, edema and/or cellulitis. No soft tissue gas. No loculated fluid collection seen. Moderate knee joint effusion. Severe knee arthrosis.  Interventions: Zosyn 3.375mg x1, Vanco 1g x1, NS 500cc x1 (08 Jul 2025 04:05)    PAST MEDICAL & SURGICAL HISTORY:  Restless leg syndrome      Diabetes mellitus      S/P appendectomy      Pacemaker        MEDICATIONS  (STANDING):  atorvastatin 10 milliGRAM(s) Oral at bedtime  ceFAZolin   IVPB 2000 milliGRAM(s) IV Intermittent every 8 hours  enoxaparin Injectable 40 milliGRAM(s) SubCutaneous every 24 hours  escitalopram 20 milliGRAM(s) Oral daily  gabapentin 200 milliGRAM(s) Oral <User Schedule>  gabapentin 100 milliGRAM(s) Oral daily  insulin lispro (ADMELOG) corrective regimen sliding scale   SubCutaneous Before meals and at bedtime  multivitamin 1 Tablet(s) Oral daily  nystatin Powder 1 Application(s) Topical two times a day  polyethylene glycol 3350 17 Gram(s) Oral every 12 hours  senna 2 Tablet(s) Oral at bedtime    MEDICATIONS  (PRN):          Home Living Status :  lives alone in an elevator accessible apartment building ,   steps to enter ,   ramp access           -  Home Services :  8 hrs x  7 days/week         -  Family support :          -  Occupation :     Baseline Functional Level Prior to Admission :             - ADL's/ IADL's :  requires partial assistance with          - Ambulatory status prior to admission :   walked with a rolling walker          FAMILY HISTORY:  No significant family history        CBC Full  -  ( 07 Jul 2025 19:33 )  WBC Count : 10.59 K/uL  RBC Count : 4.32 M/uL  Hemoglobin : 12.0 g/dL  Hematocrit : 37.4 %  Platelet Count - Automated : 230 K/uL  Mean Cell Volume : 86.6 fl  Mean Cell Hemoglobin : 27.8 pg  Mean Cell Hemoglobin Concentration : 32.1 g/dL  Auto Neutrophil # : 8.70 K/uL  Auto Lymphocyte # : 0.54 K/uL  Auto Monocyte # : 1.11 K/uL  Auto Eosinophil # : 0.16 K/uL  Auto Basophil # : 0.02 K/uL  Auto Neutrophil % : 82.1 %  Auto Lymphocyte % : 5.1 %  Auto Monocyte % : 10.5 %  Auto Eosinophil % : 1.5 %  Auto Basophil % : 0.2 %      07-07    134  |  96  |  17  ----------------------------<  171[H]  4.3   |  33[H]  |  0.65    Ca    9.0      07 Jul 2025 19:33    TPro  8.1  /  Alb  3.4  /  TBili  0.8  /  DBili  x   /  AST  28  /  ALT  23  /  AlkPhos  91  07-07      Urinalysis Basic - ( 07 Jul 2025 19:33 )    Color: x / Appearance: x / SG: x / pH: x  Gluc: 171 mg/dL / Ketone: x  / Bili: x / Urobili: x   Blood: x / Protein: x / Nitrite: x   Leuk Esterase: x / RBC: x / WBC x   Sq Epi: x / Non Sq Epi: x / Bacteria: x        Radiology :     ACC: [id number] EXAM:  CT LWR EXT IC LT   ORDERED BY: NIYA ROSE     PROCEDURE DATE:  07/07/2025          INTERPRETATION:  PROCEDURE INFORMATION:  Exam: CT Left Lower Extremity With Contrast  Exam date and time: 7/7/2025 8:49 PM  Age: 89 years old  Clinical indication: R/O osteomyelitis. Lower extremity cellulitis.    TECHNIQUE:  Imaging protocol: CT of the left lower extremity with intravenous   contrast was  performed.  Contrast material: IV: OMNIPAQUE 350; Contrast volume: 95 ml; Contrast   route:  IV;    COMPARISON:  No relevant prior studies available.    FINDINGS:  Bones/joints: Moderate knee joint effusion. Severe knee osteoarthritis.   Small Baker's cyst. No acute fracture or dislocation. No bony destruction   suggest osteomyelitis.  Soft tissues: Moderate subcutaneous fat stranding/ill-defined fluid, edema  and/or cellulitis. No soft tissue gas. No loculated fluid collection seen.  Vasculature: Atherosclerotic vascular disease without aneurysm.    IMPRESSION:  1.   No evidence of osteomyelitis.  Moderate subcutaneous fat  stranding/ill-defined fluid, edema and/or cellulitis. No soft tissue gas.   No  loculated fluid collection seen.  2.   Moderate knee joint effusion. Severe knee osteoarthritis.            Review of Systems : per HPI         Vital Signs Last 24 Hrs  T(C): 36.7 (08 Jul 2025 06:15), Max: 37.6 (07 Jul 2025 18:34)  T(F): 98 (08 Jul 2025 06:15), Max: 99.6 (07 Jul 2025 18:34)  HR: 95 (08 Jul 2025 06:15) (77 - 99)  BP: 120/75 (08 Jul 2025 06:15) (98/52 - 120/75)  BP(mean): 86 (08 Jul 2025 03:33) (86 - 86)  RR: 18 (08 Jul 2025 06:15) (16 - 18)  SpO2: 95% (08 Jul 2025 06:15) (94% - 97%)    Parameters below as of 08 Jul 2025 06:15  Patient On (Oxygen Delivery Method): room air            Physical Exam:  on  CONTACT maggot isolation ,  89 y o woman lying comfortably in semi Pacheco's position , awake , alert , no acute complaints     Head: normocephalic , atraumatic    Eyes: PERRLA , EOMI , no nystagmus , sclera anicteric    ENT / FACE: neg nasal discharge , uvula midline , no oropharyngeal erythema / exudate    Neck: supple , negative JVD , negative carotid bruits , no thyromegaly    Chest: CTA bilaterally     Cardiovascular: regular rate and rhythm , neg murmurs / rubs / gallops    Abdomen: soft , non distended , no tenderness to palpation in all 4 quadrants ,  normal bowel sounds     Extremities:  2-3 + LE edema ,     Musculoskeletal:  L wrist ganglion cyst     Neurologic Exam:     Alert and oriented to person , place , date/year     Cranial Nerves:           II:                         pupils equal , round and reactive to light , visual fields intact         III/ IV/VI:             extraocular movements intact , neg nystagmus , neg ptosis        V:                        facial sensation intact , V1-3 normal        VII:                      face symmetric , no droop , normal eye closure and smile        VIII:                     hearing intact to finger rub bilaterally        IX and X:             no hoarseness , gag intact , palate/ uvula rise symmetrically        XI:                       SCM / trapezius strength intact bilateral        XII:                      no tongue deviation    Motor Exam:        > 3+/5 x 4 extremities / R shoulder pain limited      Sensation:         intact to light touch x 4 extremities                            no neglect or extinction on double simultaneous testing    DTR:           biceps/brachioradialis: equal                            patella/ankle: equal          neg Babinski      Coordination:            Finger to Nose:  neg dysmetria bilaterally      Gait:  not tested         PM&R Impression: admitted for R I toe cellulitis      - deconditioned    - no focal weakness        Recommendations / Plan:       1) Physical / Occupational therapy focusing on therapeutic exercises , equipment evaluation , bed mobility/transfer out of bed evaluation , progressive ambulation with assistive devices prn .    2) Current disposition plan recommendation:    pending functional progress

## 2025-07-08 NOTE — CONSULT NOTE ADULT - ASSESSMENT
89yo F with DM, depression, HLD, s/p pacemaker, diabetic neuropathy presented to the ED with a chronic left great toe wound infested with maggots, present for at least one month. S/p total nail avulsion bedside 7/8. No maggots present after procedure. CT reviewed, no gas, fracture or dislocation. No leukocytosis. ESR elevated to 41. Clinically, low suspicion of OM. Wound culture taken. Will treat as SSTI.    Plan  -CT reviewed  -F/u wound cx  -c/w IV abx  -R hallucal toe was flushed with hydrogen peroxide and sterile saline  -Foot dressed with bacitracin, xeroform, kerlix and ACE wrap  -WBAT  -Rest of care up to primary team      Podiatry will follow, plan discussed with attending.   89yo F with DM, depression, HLD, s/p pacemaker, diabetic neuropathy presented to the ED with a chronic left great toe wound infested with maggots, present for at least one month. S/p total nail avulsion bedside 7/8. No maggots present after procedure. CT reviewed, no gas, no abscess or fluid collection in foot, no fracture or dislocation. No leukocytosis. ESR elevated to 41. Clinically, low suspicion of OM. Wound culture taken. Will treat as SSTI.    Plan  -CT reviewed  -F/u wound cx  -c/w IV abx  -R hallucal toe was flushed with hydrogen peroxide and sterile saline  -Foot dressed with bacitracin, xeroform, kerlix and ACE wrap  -WBAT  -Rest of care up to primary team      Podiatry will follow, plan discussed with attending.   89yo F with DM, depression, HLD, s/p pacemaker, diabetic neuropathy presented to the ED with a chronic left great toe wound infested with maggots, present for at least one month. S/p total nail avulsion bedside 7/8. No maggots present after procedure. CT reviewed, no gas, no abscess or fluid collection in foot, no fracture or dislocation. No leukocytosis. ESR elevated to 41. Clinically, low suspicion of OM. Wound culture taken. Will treat as SSTI.    Plan  -CT reviewed  -F/u wound cx  -c/w IV abx  -R hallucal toe was flushed with hydrogen peroxide and sterile saline  -Foot dressed with bacitracin, xeroform, kerlix and ACE wrap  -WBAT  -Rest of care up to primary team    Upon d/c nail avulsion instructions:  1. Do not change the dressing until tomorrow. Keep the dressing clean, dry, and  intact for 24 hours (do not bathe the area for the first 24 hours).  2. Using a clean bucket, dilute approximately 1/4 cup of Epsom Salt in warm water.  3. Remove the dressing and soak the affected foot for 10-15 minutes in the Epsom  Salt solution 1-2 times per day. After soaking, pat the feet dry with a clean towel.  4. Apply Bacitracin and Band-Aid to the affected area(s).  5. Repeat daily foot soaks for 7 days.  6. Go to outpatient office in 2 weeks to evaluate for appropriate healing.  *It is okay to take a shower if you do the epsom salt soak afterwards. Leave the  Band-Aid on when showering to protect the area.  *Please be advised, it is normal to have some pain and redness after the procedure.  It is also normal to have clear drainage and some bleeding for two to three days  after the procedure    Follow up out-patient within 1 week of discharge:  [facility]   Address: [address] [place] [address]  Phone: [phone]    Podiatry will follow, plan discussed with attending.   89yo F with DM, depression, HLD, s/p pacemaker, diabetic neuropathy presented to the ED with a chronic left great toe wound infested with maggots, present for at least one month. S/p total nail avulsion bedside 7/8. No maggots present after procedure. CT reviewed, no gas, no abscess or fluid collection in foot, no fracture or dislocation. No leukocytosis. ESR elevated to 41. Clinically, low suspicion of OM. Wound culture taken. Will treat as SSTI.    Plan  -CT reviewed  -F/u wound cx  -c/w IV abx  -L hallucal toe was flushed with hydrogen peroxide and sterile saline  -Foot dressed with bacitracin, xeroform, kerlix and ACE wrap  -WBAT  -Rest of care up to primary team    Upon d/c nail avulsion instructions:  1. Do not change the dressing until tomorrow. Keep the dressing clean, dry, and  intact for 24 hours (do not bathe the area for the first 24 hours).  2. Using a clean bucket, dilute approximately 1/4 cup of Epsom Salt in warm water.  3. Remove the dressing and soak the affected foot for 10-15 minutes in the Epsom  Salt solution 1-2 times per day. After soaking, pat the feet dry with a clean towel.  4. Apply Bacitracin and Band-Aid to the affected area(s).  5. Repeat daily foot soaks for 7 days.  6. Go to outpatient office in 2 weeks to evaluate for appropriate healing.  *It is okay to take a shower if you do the epsom salt soak afterwards. Leave the  Band-Aid on when showering to protect the area.  *Please be advised, it is normal to have some pain and redness after the procedure.  It is also normal to have clear drainage and some bleeding for two to three days  after the procedure    Follow up out-patient within 1 week of discharge:  [facility]   Address: [address] [place] [address]  Phone: [phone]    Podiatry will follow, plan discussed with attending.

## 2025-07-08 NOTE — OCCUPATIONAL THERAPY INITIAL EVALUATION ADULT - PERTINENT HX OF CURRENT PROBLEM, REHAB EVAL
89yo F with DM, depression, HLD, s/p pacemaker, diabetic neuropathy presented to the ED with a chronic left great toe wound infested with maggots, present for at least one month. Also reported a recent fall with right arm pain and decreased range of motion, though prior outpatient imaging showed no fracture or dislocation.

## 2025-07-08 NOTE — OCCUPATIONAL THERAPY INITIAL EVALUATION ADULT - MANUAL MUSCLE TESTING RESULTS, REHAB EVAL
Grossly assessed with functional movement, bilateral UE/LE greater than or equal to 3+/5; except for R shoulder flexion/abduction 3-/5

## 2025-07-08 NOTE — PHYSICAL THERAPY INITIAL EVALUATION ADULT - GENERAL OBSERVATIONS, REHAB EVAL
PT IE completed. Chart reviewed. Pt received semi-supine, NAD, +IV, +primafit, +L foot dressing C/D/I. MARIAH Sloan cleared pt for PT.

## 2025-07-08 NOTE — PATIENT PROFILE ADULT - NSTRANSFERBELONGINGSRESP_GEN_A_NUR
Pt. wanted to give money and cards to security. security called and stated because pt. is in a contact room they can't  come and collect her valuables unless she is going into surgery./yes

## 2025-07-08 NOTE — OCCUPATIONAL THERAPY INITIAL EVALUATION ADULT - GENERAL OBSERVATIONS, REHAB EVAL
OT IE completed. Orders received, chart reviewed, pt cleared for OT by MARIAH Sloan. Pt received semi supine in bed, NAD, +IV, +L foot dressing C/D/I. Pt A&Ox4, agreeable to OT, and tolerated session well.

## 2025-07-08 NOTE — OCCUPATIONAL THERAPY INITIAL EVALUATION ADULT - MODALITIES TREATMENT COMMENTS
Pt able to perform bed mobility, requiring Mod Ax1 2/2 impaired RUE strength and decreased LLE strength, impacting ability to weight shift. Pt performed UB/LB dressing while seated at EOB, requiring 1 person assist 2/2 impaired dynamic balance and decreased functional reach, due to impaired RUE strength and decreased postural control. Pt performed sit<->stand with Mod Ax2, and able to ambulate ~3ft from bed to chair with Max Ax2 ( b/l hand held assist ) demo impaired balance with decreased step length and impaired ability to weight shift due to impaired LLE strength. Pt left seated in chair, +all lines, +call CHRIS gray RN Bruna made aware of outcome. Pt would benefit from continued OT services to facilitate increased independence with functional mobility/ADLs.

## 2025-07-08 NOTE — OCCUPATIONAL THERAPY INITIAL EVALUATION ADULT - ADDITIONAL COMMENTS
Pt reports living in apartment, without NURY, alone. Reports having HHA for 7 days 10a-6p. Reports using   RW and wheelchair for community access.

## 2025-07-08 NOTE — H&P ADULT - PROBLEM SELECTOR PLAN 6
#Restless leg syndrome  Home meds: Recently uptitrated gabapentin 200mg am, 100mg afternoon, 200mg qhs    -C/W home med

## 2025-07-08 NOTE — PROGRESS NOTE ADULT - PROBLEM SELECTOR PLAN 7
Home med mirabegron 50mg qd    -Non formulary, pt to bring in home med Home med mirabegron 50mg qd    -UA w/ reflex  -Non formulary, pt to bring in home med

## 2025-07-08 NOTE — PROGRESS NOTE ADULT - PROBLEM SELECTOR PLAN 4
Reports last A1c 1 month ago 6.3, no record in HIE  Home meds: Repaglinide 1mg qd, Metformin-sitagliptan     -mISS  -A1c in AM Current A1c: Pending  Reports last A1c 1 month ago 6.3, no record in HIE  Home meds: Repaglinide 1mg qd, Metformin-sitagliptan     -mISS

## 2025-07-08 NOTE — H&P ADULT - ATTENDING COMMENTS
89yo F with DM, depression, HLD, s/p pacemaker, diabetic neuropathy presented to the ED with a chronic left great toe wound infested with maggots, present for at least one month. Also reported a recent fall with right arm pain   - Good ROM in right shoulder today.   - Blood Cx positive for E coli. ; UA negative. Likely Source: Left toe wound infested with maggots.  ; No other localizing signs of infection today   - Zosyn for now (favoring zosyn over ceftriaxone per [facility] 2024 antibiogram)    - wound care per podiatry   dvt ppx - lovenox

## 2025-07-08 NOTE — PROGRESS NOTE ADULT - ASSESSMENT
89yo F with DM, depression, HLD, s/p pacemaker, diabetic neuropathy presented to the ED with a chronic left great toe wound infested with maggots, present for at least one month. Also reported a recent fall with right arm pain and decreased range of motion, though prior outpatient imaging showed no fracture or dislocation. Admitted for further management 89yo F with DM, depression, HLD, s/p pacemaker, diabetic neuropathy presented to the ED with a chronic left great toe wound infested with maggots, present for at least one month. Also reported a recent fall with right arm pain and decreased range of motion, though prior outpatient imaging showed no fracture or dislocation.

## 2025-07-08 NOTE — H&P ADULT - NSHPLABSRESULTS_GEN_ALL_CORE
.  LABS:                         12.0   10.59 )-----------( 230      ( 07 Jul 2025 19:33 )             37.4     07-07    134  |  96  |  17  ----------------------------<  171[H]  4.3   |  33[H]  |  0.65    Ca    9.0      07 Jul 2025 19:33    TPro  8.1  /  Alb  3.4  /  TBili  0.8  /  DBili  x   /  AST  28  /  ALT  23  /  AlkPhos  91  07-07      Urinalysis Basic - ( 07 Jul 2025 19:33 )    Color: x / Appearance: x / SG: x / pH: x  Gluc: 171 mg/dL / Ketone: x  / Bili: x / Urobili: x   Blood: x / Protein: x / Nitrite: x   Leuk Esterase: x / RBC: x / WBC x   Sq Epi: x / Non Sq Epi: x / Bacteria: x                RADIOLOGY, EKG & ADDITIONAL TESTS: Reviewed.

## 2025-07-08 NOTE — H&P ADULT - PROBLEM SELECTOR PLAN 1
Pt presenting wound to left great toe, present >1 month  Denies any recollection of injury to her toe, although notes fall 1 week ago  Afebrile in ED, HR 90's, WBC 10.5  CT RLE: No evidence of osteomyelitis.  Moderate subcutaneous fat stranding/ill-defined fluid, edema and/or cellulitis. No soft tissue gas. No loculated fluid collection seen.    -Start CTX, low concern for MRSA risk factors (no recent hosp, coming from home, reports A1c last month 6.3)  -F/U blood cultures  -Podiatry consult in AM Pt presenting wound to left great toe, present >1 month  Denies any recollection of injury to her toe, although notes fall 1 week ago  Afebrile in ED, HR 90's, WBC 10.5  CT RLE: No evidence of osteomyelitis.  Moderate subcutaneous fat stranding/ill-defined fluid, edema and/or cellulitis. No soft tissue gas. No loculated fluid collection seen.    -Start Cefazolin, low concern for MRSA risk factors (no recent hosp, coming from home, reports A1c last month 6.3)  -F/U blood cultures  -Podiatry consult in AM

## 2025-07-08 NOTE — H&P ADULT - ASSESSMENT
89yo F with DM, depression, HLD, s/p pacemaker, diabetic neuropathy presented to the ED with a chronic left great toe wound infested with maggots, present for at least one month. Also reported a recent fall with right arm pain and decreased range of motion, though prior outpatient imaging showed no fracture or dislocation. Admitted for further management

## 2025-07-08 NOTE — PROGRESS NOTE ADULT - PROBLEM SELECTOR PLAN 1
Pt presenting wound to left great toe, present >1 month  Denies any recollection of injury to her toe, although notes fall 1 week ago  Afebrile in ED, HR 90's, WBC 10.5  CT RLE: No evidence of osteomyelitis.  Moderate subcutaneous fat stranding/ill-defined fluid, edema and/or cellulitis. No soft tissue gas. No loculated fluid collection seen.    -Start Cefazolin, low concern for MRSA risk factors (no recent hosp, coming from home, reports A1c last month 6.3)  -F/U blood cultures  -Podiatry consult in AM Pt presenting wound to left great toe, present >1 month  Patient remains afebrile. Elevated ESR (41) and CRP (29.1). Blood cultures revealed gram negative rods.   Podiatry consulted.    ED COURSE:  Denies any recollection of injury to her toe, although notes fall 1 week ago  Afebrile in ED, HR 90's, WBC 10.5.  CT RLE: No evidence of osteomyelitis.  Fat stranding, ill-defined fluid, edema, and/or cellulitis. No gas, no fluid seen.     -Initiate Zosyn for cellulitis  -Discontinue Cefazolin  -Appreciate podiatry recs  -low concern for MRSA risk factors (no recent hosp, coming from home, reports A1c last month 6.3)  -F/U E.Coli susceptibilities

## 2025-07-09 LAB
A1C WITH ESTIMATED AVERAGE GLUCOSE RESULT: 6 % — HIGH (ref 4–5.6)
ANION GAP SERPL CALC-SCNC: 10 MMOL/L — SIGNIFICANT CHANGE UP (ref 5–17)
ANION GAP SERPL CALC-SCNC: 10 MMOL/L — SIGNIFICANT CHANGE UP (ref 5–17)
APPEARANCE UR: CLEAR — SIGNIFICANT CHANGE UP
BASOPHILS # BLD AUTO: 0.02 K/UL — SIGNIFICANT CHANGE UP (ref 0–0.2)
BASOPHILS NFR BLD AUTO: 0.2 % — SIGNIFICANT CHANGE UP (ref 0–2)
BILIRUB UR-MCNC: NEGATIVE — SIGNIFICANT CHANGE UP
BUN SERPL-MCNC: 12 MG/DL — SIGNIFICANT CHANGE UP (ref 7–23)
BUN SERPL-MCNC: 14 MG/DL — SIGNIFICANT CHANGE UP (ref 7–23)
CALCIUM SERPL-MCNC: 8.4 MG/DL — SIGNIFICANT CHANGE UP (ref 8.4–10.5)
CALCIUM SERPL-MCNC: 8.5 MG/DL — SIGNIFICANT CHANGE UP (ref 8.4–10.5)
CHLORIDE SERPL-SCNC: 96 MMOL/L — SIGNIFICANT CHANGE UP (ref 96–108)
CHLORIDE SERPL-SCNC: 96 MMOL/L — SIGNIFICANT CHANGE UP (ref 96–108)
CO2 SERPL-SCNC: 24 MMOL/L — SIGNIFICANT CHANGE UP (ref 22–31)
CO2 SERPL-SCNC: 27 MMOL/L — SIGNIFICANT CHANGE UP (ref 22–31)
COLOR SPEC: YELLOW — SIGNIFICANT CHANGE UP
CREAT ?TM UR-MCNC: 22 MG/DL — SIGNIFICANT CHANGE UP
CREAT SERPL-MCNC: 0.55 MG/DL — SIGNIFICANT CHANGE UP (ref 0.5–1.3)
CREAT SERPL-MCNC: 0.6 MG/DL — SIGNIFICANT CHANGE UP (ref 0.5–1.3)
DIFF PNL FLD: NEGATIVE — SIGNIFICANT CHANGE UP
EGFR: 86 ML/MIN/1.73M2 — SIGNIFICANT CHANGE UP
EGFR: 86 ML/MIN/1.73M2 — SIGNIFICANT CHANGE UP
EGFR: 88 ML/MIN/1.73M2 — SIGNIFICANT CHANGE UP
EGFR: 88 ML/MIN/1.73M2 — SIGNIFICANT CHANGE UP
EOSINOPHIL # BLD AUTO: 0.15 K/UL — SIGNIFICANT CHANGE UP (ref 0–0.5)
EOSINOPHIL NFR BLD AUTO: 1.8 % — SIGNIFICANT CHANGE UP (ref 0–6)
ESTIMATED AVERAGE GLUCOSE: 126 MG/DL — HIGH (ref 68–114)
GLUCOSE SERPL-MCNC: 128 MG/DL — HIGH (ref 70–99)
GLUCOSE SERPL-MCNC: 143 MG/DL — HIGH (ref 70–99)
GLUCOSE UR QL: NEGATIVE MG/DL — SIGNIFICANT CHANGE UP
HCT VFR BLD CALC: 35.1 % — SIGNIFICANT CHANGE UP (ref 34.5–45)
HGB BLD-MCNC: 11.2 G/DL — LOW (ref 11.5–15.5)
IMM GRANULOCYTES # BLD AUTO: 0.04 K/UL — SIGNIFICANT CHANGE UP (ref 0–0.07)
IMM GRANULOCYTES NFR BLD AUTO: 0.5 % — SIGNIFICANT CHANGE UP (ref 0–0.9)
KETONES UR QL: NEGATIVE MG/DL — SIGNIFICANT CHANGE UP
LEUKOCYTE ESTERASE UR-ACNC: NEGATIVE — SIGNIFICANT CHANGE UP
LYMPHOCYTES # BLD AUTO: 0.91 K/UL — LOW (ref 1–3.3)
LYMPHOCYTES NFR BLD AUTO: 11.2 % — LOW (ref 13–44)
MAGNESIUM SERPL-MCNC: 2 MG/DL — SIGNIFICANT CHANGE UP (ref 1.6–2.6)
MCHC RBC-ENTMCNC: 27.9 PG — SIGNIFICANT CHANGE UP (ref 27–34)
MCHC RBC-ENTMCNC: 31.9 G/DL — LOW (ref 32–36)
MCV RBC AUTO: 87.3 FL — SIGNIFICANT CHANGE UP (ref 80–100)
MONOCYTES # BLD AUTO: 1.14 K/UL — HIGH (ref 0–0.9)
MONOCYTES NFR BLD AUTO: 14.1 % — HIGH (ref 2–14)
NEUTROPHILS # BLD AUTO: 5.85 K/UL — SIGNIFICANT CHANGE UP (ref 1.8–7.4)
NEUTROPHILS NFR BLD AUTO: 72.2 % — SIGNIFICANT CHANGE UP (ref 43–77)
NITRITE UR-MCNC: NEGATIVE — SIGNIFICANT CHANGE UP
NRBC # BLD AUTO: 0 K/UL — SIGNIFICANT CHANGE UP (ref 0–0)
NRBC # FLD: 0 K/UL — SIGNIFICANT CHANGE UP (ref 0–0)
NRBC BLD AUTO-RTO: 0 /100 WBCS — SIGNIFICANT CHANGE UP (ref 0–0)
OSMOLALITY SERPL: 282 MOSM/KG — SIGNIFICANT CHANGE UP (ref 280–301)
OSMOLALITY UR: 403 MOSM/KG — SIGNIFICANT CHANGE UP (ref 300–900)
PH UR: 8 — SIGNIFICANT CHANGE UP (ref 5–8)
PHOSPHATE SERPL-MCNC: 3.5 MG/DL — SIGNIFICANT CHANGE UP (ref 2.5–4.5)
PLATELET # BLD AUTO: 226 K/UL — SIGNIFICANT CHANGE UP (ref 150–400)
PMV BLD: 11 FL — SIGNIFICANT CHANGE UP (ref 7–13)
POTASSIUM SERPL-MCNC: 4.2 MMOL/L — SIGNIFICANT CHANGE UP (ref 3.5–5.3)
POTASSIUM SERPL-MCNC: 4.3 MMOL/L — SIGNIFICANT CHANGE UP (ref 3.5–5.3)
POTASSIUM SERPL-SCNC: 4.2 MMOL/L — SIGNIFICANT CHANGE UP (ref 3.5–5.3)
POTASSIUM SERPL-SCNC: 4.3 MMOL/L — SIGNIFICANT CHANGE UP (ref 3.5–5.3)
PROT ?TM UR-MCNC: 5 MG/DL — SIGNIFICANT CHANGE UP (ref 0–12)
PROT UR-MCNC: NEGATIVE MG/DL — SIGNIFICANT CHANGE UP
PROT/CREAT UR-RTO: 0.2 RATIO — SIGNIFICANT CHANGE UP (ref 0–0.2)
RBC # BLD: 4.02 M/UL — SIGNIFICANT CHANGE UP (ref 3.8–5.2)
RBC # FLD: 15.5 % — HIGH (ref 10.3–14.5)
SODIUM SERPL-SCNC: 130 MMOL/L — LOW (ref 135–145)
SODIUM SERPL-SCNC: 133 MMOL/L — LOW (ref 135–145)
SODIUM UR-SCNC: 121 MMOL/L — SIGNIFICANT CHANGE UP
SP GR SPEC: 1.01 — SIGNIFICANT CHANGE UP (ref 1–1.03)
UROBILINOGEN FLD QL: 1 MG/DL — SIGNIFICANT CHANGE UP (ref 0.2–1)
UUN UR-MCNC: 335 MG/DL — SIGNIFICANT CHANGE UP
WBC # BLD: 8.11 K/UL — SIGNIFICANT CHANGE UP (ref 3.8–10.5)
WBC # FLD AUTO: 8.11 K/UL — SIGNIFICANT CHANGE UP (ref 3.8–10.5)

## 2025-07-09 PROCEDURE — 84145 PROCALCITONIN (PCT): CPT

## 2025-07-09 PROCEDURE — 82962 GLUCOSE BLOOD TEST: CPT

## 2025-07-09 PROCEDURE — 84100 ASSAY OF PHOSPHORUS: CPT

## 2025-07-09 PROCEDURE — 83605 ASSAY OF LACTIC ACID: CPT

## 2025-07-09 PROCEDURE — 85652 RBC SED RATE AUTOMATED: CPT

## 2025-07-09 PROCEDURE — 80053 COMPREHEN METABOLIC PANEL: CPT

## 2025-07-09 PROCEDURE — 87150 DNA/RNA AMPLIFIED PROBE: CPT

## 2025-07-09 PROCEDURE — 82803 BLOOD GASES ANY COMBINATION: CPT

## 2025-07-09 PROCEDURE — 80048 BASIC METABOLIC PNL TOTAL CA: CPT

## 2025-07-09 PROCEDURE — 83735 ASSAY OF MAGNESIUM: CPT

## 2025-07-09 PROCEDURE — 87077 CULTURE AEROBIC IDENTIFY: CPT

## 2025-07-09 PROCEDURE — 81003 URINALYSIS AUTO W/O SCOPE: CPT

## 2025-07-09 PROCEDURE — 85025 COMPLETE CBC W/AUTO DIFF WBC: CPT

## 2025-07-09 PROCEDURE — 93970 EXTREMITY STUDY: CPT | Mod: 26

## 2025-07-09 PROCEDURE — 73030 X-RAY EXAM OF SHOULDER: CPT

## 2025-07-09 PROCEDURE — 73070 X-RAY EXAM OF ELBOW: CPT

## 2025-07-09 PROCEDURE — 73701 CT LOWER EXTREMITY W/DYE: CPT

## 2025-07-09 PROCEDURE — 83036 HEMOGLOBIN GLYCOSYLATED A1C: CPT

## 2025-07-09 PROCEDURE — 86140 C-REACTIVE PROTEIN: CPT

## 2025-07-09 PROCEDURE — 36415 COLL VENOUS BLD VENIPUNCTURE: CPT

## 2025-07-09 PROCEDURE — 84156 ASSAY OF PROTEIN URINE: CPT

## 2025-07-09 PROCEDURE — 99233 SBSQ HOSP IP/OBS HIGH 50: CPT | Mod: GC

## 2025-07-09 PROCEDURE — 84540 ASSAY OF URINE/UREA-N: CPT

## 2025-07-09 PROCEDURE — 87205 SMEAR GRAM STAIN: CPT

## 2025-07-09 PROCEDURE — 83935 ASSAY OF URINE OSMOLALITY: CPT

## 2025-07-09 PROCEDURE — 87075 CULTR BACTERIA EXCEPT BLOOD: CPT

## 2025-07-09 PROCEDURE — 82570 ASSAY OF URINE CREATININE: CPT

## 2025-07-09 PROCEDURE — 87040 BLOOD CULTURE FOR BACTERIA: CPT

## 2025-07-09 PROCEDURE — 87070 CULTURE OTHR SPECIMN AEROBIC: CPT

## 2025-07-09 PROCEDURE — 84300 ASSAY OF URINE SODIUM: CPT

## 2025-07-09 PROCEDURE — 83930 ASSAY OF BLOOD OSMOLALITY: CPT

## 2025-07-09 RX ORDER — SODIUM CHLORIDE 9 G/1000ML
1000 INJECTION, SOLUTION INTRAVENOUS ONCE
Refills: 0 | Status: COMPLETED | OUTPATIENT
Start: 2025-07-09 | End: 2025-07-09

## 2025-07-09 RX ADMIN — SODIUM CHLORIDE 1000 MILLILITER(S): 9 INJECTION, SOLUTION INTRAVENOUS at 11:18

## 2025-07-09 RX ADMIN — Medication 1 TABLET(S): at 11:17

## 2025-07-09 RX ADMIN — NYSTATIN 1 APPLICATION(S): 100000 CREAM TOPICAL at 17:52

## 2025-07-09 RX ADMIN — Medication 25 GRAM(S): at 09:24

## 2025-07-09 RX ADMIN — GABAPENTIN 200 MILLIGRAM(S): 400 CAPSULE ORAL at 17:51

## 2025-07-09 RX ADMIN — NYSTATIN 1 APPLICATION(S): 100000 CREAM TOPICAL at 06:50

## 2025-07-09 RX ADMIN — Medication 25 GRAM(S): at 01:36

## 2025-07-09 RX ADMIN — ESCITALOPRAM OXALATE 20 MILLIGRAM(S): 20 TABLET ORAL at 11:17

## 2025-07-09 RX ADMIN — ENOXAPARIN SODIUM 40 MILLIGRAM(S): 100 INJECTION SUBCUTANEOUS at 06:44

## 2025-07-09 RX ADMIN — INSULIN LISPRO 4: 100 INJECTION, SOLUTION INTRAVENOUS; SUBCUTANEOUS at 13:04

## 2025-07-09 RX ADMIN — ATORVASTATIN CALCIUM 10 MILLIGRAM(S): 80 TABLET, FILM COATED ORAL at 21:48

## 2025-07-09 RX ADMIN — INSULIN LISPRO 4: 100 INJECTION, SOLUTION INTRAVENOUS; SUBCUTANEOUS at 22:26

## 2025-07-09 RX ADMIN — Medication 2 TABLET(S): at 21:47

## 2025-07-09 RX ADMIN — GABAPENTIN 200 MILLIGRAM(S): 400 CAPSULE ORAL at 06:44

## 2025-07-09 RX ADMIN — GABAPENTIN 100 MILLIGRAM(S): 400 CAPSULE ORAL at 11:17

## 2025-07-09 RX ADMIN — POLYETHYLENE GLYCOL 3350 17 GRAM(S): 17 POWDER, FOR SOLUTION ORAL at 06:44

## 2025-07-09 RX ADMIN — Medication 25 GRAM(S): at 17:52

## 2025-07-09 NOTE — PROGRESS NOTE ADULT - ATTENDING COMMENTS
89yo F with DM, depression, HLD, s/p pacemaker, diabetic neuropathy presented to the ED with a chronic left great toe wound infested with maggots, present for at least one month. Also reported a recent fall with right arm pain   - Good ROM in right shoulder today.   - Blood Cx positive for E coli. ; UA negative. Likely Source: Left toe wound infested with maggots.  ; No other localizing signs of infection today   - Zosyn for now (favoring zosyn over ceftriaxone per [facility] 2024 antibiogram)    - wound care per podiatry     Left leg swelling  - likely cellulitis due to infected left great toe wound   [ ] f/u bilateral LE dopplers to rule out DVT .       dvt ppx - lovenox .    Time-based billing (NON-critical care).     52 minutes spent on total encounter. The necessity of the time spent during the encounter on this date of service was due to:     Bedside exam and interview   Reviewed vitals, labs   Discussed patient's plan of care with housestaff   Documentation of encounter  Excludes teaching time and/or separately reported services.

## 2025-07-09 NOTE — PROGRESS NOTE ADULT - PROBLEM SELECTOR PLAN 1
Pt presenting wound to left great toe, present >1 month  Patient remains afebrile. Elevated ESR (41) and CRP (29.1). Blood cultures revealed gram negative rods.   Podiatry consulted.    ED COURSE:  Denies any recollection of injury to her toe, although notes fall 1 week ago  Afebrile in ED, HR 90's, WBC 10.5.  CT RLE: No evidence of osteomyelitis.  Fat stranding, ill-defined fluid, edema, and/or cellulitis. No gas, no fluid seen.     -Initiate Zosyn for cellulitis  -Discontinue Cefazolin  -Appreciate podiatry recs  -low concern for MRSA risk factors (no recent hosp, coming from home, reports A1c last month 6.3)  -F/U E.Coli susceptibilities

## 2025-07-09 NOTE — PROGRESS NOTE ADULT - SUBJECTIVE AND OBJECTIVE BOX
Patient is a 89y old  Female who presents with a chief complaint of L great toe cellulitis (08 Jul 2025 13:45)      INTERVAL HPI/ OVERNIGHT EVENTS  O/n d/c cefazolin and started zosyn IV. Pt seen and examined bedside. Foot dressings C/D/I. No acute pedal complaints.    LABS                        11.2   8.11  )-----------( 226      ( 09 Jul 2025 07:10 )             35.1     07-09    130[L]  |  96  |  14  ----------------------------<  128[H]  4.3   |  24  |  0.55    Ca    8.5      09 Jul 2025 07:10  Phos  3.5     07-09  Mg     2.0     07-09    TPro  8.1  /  Alb  3.4  /  TBili  0.8  /  DBili  x   /  AST  28  /  ALT  23  /  AlkPhos  91  07-07      ESR: 41  CRP: --  07-08 @ 12:31    ICU Vital Signs Last 24 Hrs  T(C): 36.8 (09 Jul 2025 05:11), Max: 37 (08 Jul 2025 13:00)  T(F): 98.2 (09 Jul 2025 05:11), Max: 98.6 (08 Jul 2025 13:00)  HR: 83 (09 Jul 2025 05:11) (83 - 95)  BP: 131/68 (09 Jul 2025 05:11) (107/53 - 131/76)  BP(mean): 89 (09 Jul 2025 05:11) (71 - 89)  ABP: --  ABP(mean): --  RR: 17 (09 Jul 2025 05:11) (17 - 18)  SpO2: 95% (09 Jul 2025 05:11) (94% - 96%)    O2 Parameters below as of 09 Jul 2025 05:11  Patient On (Oxygen Delivery Method): room air            RADIOLOGY  < from: CT Lower Extremity w/ IV Cont, Left (07.07.25 @ 21:08) >  IMPRESSION:  1.   No evidence of osteomyelitis.  Moderate subcutaneous fat  stranding/ill-defined fluid, edema and/or cellulitis. No soft tissue gas.   No  loculated fluid collection seen.  2.   Moderate knee joint effusion. Severe knee osteoarthritis.    --- End of Report ---    < end of copied text >      MICROBIOLOGY    PHYSICAL EXAM  Lower Extremity Focused  Vasc: 2+ DP/PT L. No edema. No erythema.  Derm: S/p L hallucal total nail avulsion with granular nail bed. No edema. No erythema. No drainage. (-) PTB. No malodor. No fluctuance.  Neuro: Diminished sensation L  MSK: (-) TTP to L hallucal nail bed

## 2025-07-09 NOTE — PROGRESS NOTE ADULT - SUBJECTIVE AND OBJECTIVE BOX
*****INCOMPLETE NOTE*****    INTERVAL HPI/OVERNIGHT EVENTS:    SUBJECTIVE: Patient seen and examined at bedside, resting comfortably in bed, and does not appear to be in any acute distress. Patient reports    Vital Signs Last 24 Hrs  T(C): 36.8 (09 Jul 2025 05:11), Max: 37 (08 Jul 2025 13:00)  T(F): 98.2 (09 Jul 2025 05:11), Max: 98.6 (08 Jul 2025 13:00)  HR: 83 (09 Jul 2025 05:11) (83 - 95)  BP: 131/68 (09 Jul 2025 05:11) (107/53 - 131/76)  BP(mean): 89 (09 Jul 2025 05:11) (71 - 89)  RR: 17 (09 Jul 2025 05:11) (17 - 18)  SpO2: 95% (09 Jul 2025 05:11) (94% - 96%)    Parameters below as of 09 Jul 2025 05:11  Patient On (Oxygen Delivery Method): room air        PHYSICAL EXAM:  General: in no acute distress  Eyes: EOMI intact bilaterally. Anicteric sclerae, moist conjunctivae  HENT: Moist mucous membranes  Neck: Trachea midline, supple  Lungs: CTA B/L. No wheezes, rales, or rhonchi  Cardiovascular: RRR. No murmurs, rubs, or gallops  Abdomen: Soft, non-tender non-distended; No rebound or guarding  Extremities: WWP, No clubbing, cyanosis or edema  Neurological: Alert and oriented  Skin: Warm and dry. No obvious rash     LABS:                        11.2   8.11  )-----------( 226      ( 09 Jul 2025 07:10 )             35.1     07-09    130[L]  |  96  |  14  ----------------------------<  128[H]  4.3   |  24  |  0.55    Ca    8.5      09 Jul 2025 07:10  Phos  3.5     07-09  Mg     2.0     07-09    TPro  8.1  /  Alb  3.4  /  TBili  0.8  /  DBili  x   /  AST  28  /  ALT  23  /  AlkPhos  91  07-07

## 2025-07-09 NOTE — PROGRESS NOTE ADULT - ASSESSMENT
I M    89yo F with DM, depression, HLD, s/p pacemaker, diabetic neuropathy presented to the ED with a chronic left great toe wound infested with maggots, present for at least one month. Also reported a recent fall with right arm pain and decreased range of motion, though prior outpatient imaging showed no fracture or dislocation.       Problem/Plan - 1:  ·  Problem: Cellulitis of great toe, left.   ·  Plan: Pt presenting wound to left great toe, present >1 month  Patient remains afebrile. Elevated ESR (41) and CRP (29.1). Blood cultures revealed gram negative rods.   Podiatry consulted.    ED COURSE:  Denies any recollection of injury to her toe, although notes fall 1 week ago  Afebrile in ED, HR 90's, WBC 10.5.  CT RLE: No evidence of osteomyelitis.  Fat stranding, ill-defined fluid, edema, and/or cellulitis. No gas, no fluid seen.     -Initiate Zosyn for cellulitis  -Discontinue Cefazolin  -Appreciate podiatry recs  -low concern for MRSA risk factors (no recent hosp, coming from home, reports A1c last month 6.3)  -F/U E.Coli susceptibilities.    Problem/Plan - 2:  ·  Problem: Maggot infestation.   ·  Plan: As above.    Problem/Plan - 3:  ·  Problem: Fall.   ·  Plan: Pt reporting trip and fall 1 week ago at home, denies headstrike/LOC. Felt a pop in her R shoulder with decreased ROM following fall.  R shoulder XR from 5/17: No acute abnormality visualized. Moderate osteoarthritis.    -Repeat shoulder XR  -PT/OT.    Problem/Plan - 4:  ·  Problem: Diabetes.   ·  Plan: Current A1c: Pending  Reports last A1c 1 month ago 6.3, no record in HIE  Home meds: Repaglinide 1mg qd, Metformin-sitagliptan     -mISS.    Problem/Plan - 5:  ·  Problem: Hyperlipidemia.   ·  Plan: Home meds: Simvastatin 10mg qhs    -C/w home med.    Problem/Plan - 6:  ·  Problem: Neuropathy.   ·  Plan: #Restless leg syndrome  Home meds: Recently uptitrated gabapentin 200mg am, 100mg afternoon, 200mg qhs    -C/W home med.    Problem/Plan - 7:  ·  Problem: Overactive bladder.   ·  Plan: Home med mirabegron 50mg qd    -UA w/ reflex  -Non formulary, pt to bring in home med.    Problem/Plan - 8:  ·  Problem: Depression, major.   ·  Plan: Home med escitalopram 20mg qd    -C/W home med.    Problem/Plan - 9:  ·  Problem: Prophylactic measure.   ·  Plan: Fluids: S/P 500cc NS, encourage PO  Electrolytes: Replete PRN  Diet: CC  DVT: Lovenox  Dispo: F  Code: DNR/DNI.

## 2025-07-09 NOTE — PROGRESS NOTE ADULT - ASSESSMENT
89yo F with DM, depression, HLD, s/p pacemaker, diabetic neuropathy presented to the ED with a chronic left great toe wound infested with maggots, present for at least one month. S/p total nail avulsion bedside 7/8. No maggots present after procedure. CT reviewed, no gas, no abscess or fluid collection in foot, no fracture or dislocation. No leukocytosis. ESR elevated to 41. Clinically, low suspicion of OM. Wound culture taken. Will treat as SSTI.    Plan  -F/u wound cx  -c/w IV abx  -Foot dressed with betadine, adaptic, kerlix and ACE wrap  -WBAT  -Rest of care up to primary team    Upon d/c nail avulsion instructions:  1. Do not change the dressing until tomorrow. Keep the dressing clean, dry, and  intact for 24 hours (do not bathe the area for the first 24 hours).  2. Using a clean bucket, dilute approximately 1/4 cup of Epsom Salt in warm water.  3. Remove the dressing and soak the affected foot for 10-15 minutes in the Epsom  Salt solution 1-2 times per day. After soaking, pat the feet dry with a clean towel.  4. Apply Bacitracin and Band-Aid to the affected area(s).  5. Repeat daily foot soaks for 7 days.  6. Go to outpatient office in 2 weeks to evaluate for appropriate healing.  *It is okay to take a shower if you do the epsom salt soak afterwards. Leave the  Band-Aid on when showering to protect the area.  *Please be advised, it is normal to have some pain and redness after the procedure.  It is also normal to have clear drainage and some bleeding for two to three days  after the procedure    Follow up out-patient within 1 week of discharge:  [facility]   Address: [address] [place] [address]  Phone: [phone]    Podiatry will follow, plan discussed with attending.

## 2025-07-09 NOTE — PROGRESS NOTE ADULT - PROBLEM SELECTOR PLAN 4
Current A1c: Pending  Reports last A1c 1 month ago 6.3, no record in HIE  Home meds: Repaglinide 1mg qd, Metformin-sitagliptan     -mISS

## 2025-07-09 NOTE — PROGRESS NOTE ADULT - SUBJECTIVE AND OBJECTIVE BOX
Physical Medicine and Rehabilitation Progress Note :       Patient is a 89y old  Female who presents with a chief complaint of L great toe cellulitis (09 Jul 2025 08:54)      HPI:  89yo F with DM, depression, HLD, s/p pacemaker, diabetic neuropathy, c/o infection to left big toe with maggot on it for at least a month. She is unsure when the wound first started. Noted to have L great toe nail avulsion with maggots present in wound. She lived alone up until 1 month ago, but has HHA now 10a-6p daily. Reports she has had increasing difficulty ambulating around her apartment, had a fall 1 wk ago and felt a pop in her right arm, has had decreased ROM since the fall. Reports she had outpatient imaging done without fx or dislocation. No headstrike/LOC. Otherwise denies any fevers, chills, N/V/D, abdominal pain, chest pain or SOB.     In the ED  Vitals: T 99.6 rectal, HR 99, /67, RR 16 O2 96% on RA  Labs: WBC 10.5, Hb 12, Plts 230, Na 134, K 4.3, CO2 33, Gap 5, Cr 0.65  Imaging: CT LLE with IV contrast: No evidence of osteomyelitis.  Moderate subcutaneous fat stranding/ill-defined fluid, edema and/or cellulitis. No soft tissue gas. No loculated fluid collection seen. Moderate knee joint effusion. Severe knee arthrosis.  Interventions: Zosyn 3.375mg x1, Vanco 1g x1, NS 500cc x1 (08 Jul 2025 04:05)                            11.2   8.11  )-----------( 226      ( 09 Jul 2025 07:10 )             35.1       07-09    130[L]  |  96  |  14  ----------------------------<  128[H]  4.3   |  24  |  0.55    Ca    8.5      09 Jul 2025 07:10  Phos  3.5     07-09  Mg     2.0     07-09    TPro  8.1  /  Alb  3.4  /  TBili  0.8  /  DBili  x   /  AST  28  /  ALT  23  /  AlkPhos  91  07-07    Vital Signs Last 24 Hrs  T(C): 36.3 (09 Jul 2025 11:59), Max: 37 (08 Jul 2025 13:00)  T(F): 97.3 (09 Jul 2025 11:59), Max: 98.6 (08 Jul 2025 13:00)  HR: 91 (09 Jul 2025 11:59) (83 - 95)  BP: 112/61 (09 Jul 2025 11:59) (107/53 - 131/76)  BP(mean): 78 (09 Jul 2025 11:59) (71 - 89)  RR: 17 (09 Jul 2025 11:59) (17 - 18)  SpO2: 92% (09 Jul 2025 11:59) (92% - 96%)    Parameters below as of 09 Jul 2025 11:59  Patient On (Oxygen Delivery Method): room air        MEDICATIONS  (STANDING):  atorvastatin 10 milliGRAM(s) Oral at bedtime  enoxaparin Injectable 40 milliGRAM(s) SubCutaneous every 24 hours  escitalopram 20 milliGRAM(s) Oral daily  gabapentin 200 milliGRAM(s) Oral <User Schedule>  gabapentin 100 milliGRAM(s) Oral daily  insulin lispro (ADMELOG) corrective regimen sliding scale   SubCutaneous Before meals and at bedtime  multivitamin 1 Tablet(s) Oral daily  nystatin Powder 1 Application(s) Topical two times a day  piperacillin/tazobactam IVPB.. 3.375 Gram(s) IV Intermittent every 8 hours  polyethylene glycol 3350 17 Gram(s) Oral every 12 hours  senna 2 Tablet(s) Oral at bedtime    MEDICATIONS  (PRN):      T(C): 36.3 (07-09-25 @ 11:59), Max: 37 (07-08-25 @ 13:00)  HR: 91 (07-09-25 @ 11:59) (83 - 95)  BP: 112/61 (07-09-25 @ 11:59) (107/53 - 131/76)  RR: 17 (07-09-25 @ 11:59) (17 - 18)  SpO2: 92% (07-09-25 @ 11:59) (92% - 96%)    Physical Exam:    on  CONTACT maggot isolation ,  89 y o woman lying comfortably in semi Pacheco's position , awake , alert , no acute complaints     Head: normocephalic , atraumatic    Eyes: PERRLA , EOMI , no nystagmus , sclera anicteric    ENT / FACE: neg nasal discharge , uvula midline , no oropharyngeal erythema / exudate    Neck: supple , negative JVD , negative carotid bruits , no thyromegaly    Chest: CTA bilaterally     Cardiovascular: regular rate and rhythm , neg murmurs / rubs / gallops    Abdomen: soft , non distended , no tenderness to palpation in all 4 quadrants ,  normal bowel sounds     Extremities:  2-3 + LE edema ,     Musculoskeletal:  L wrist ganglion cyst     Neurologic Exam:     Alert and oriented to person , place , date/year     Cranial Nerves:           II:                         pupils equal , round and reactive to light , visual fields intact         III/ IV/VI:             extraocular movements intact , neg nystagmus , neg ptosis        V:                        facial sensation intact , V1-3 normal        VII:                      face symmetric , no droop , normal eye closure and smile        VIII:                     hearing intact to finger rub bilaterally        IX and X:             no hoarseness , gag intact , palate/ uvula rise symmetrically        XI:                       SCM / trapezius strength intact bilateral        XII:                      no tongue deviation    Motor Exam:        > 3+/5 x 4 extremities / R shoulder pain limited      Sensation:         intact to light touch x 4 extremities                            no neglect or extinction on double simultaneous testing    DTR:           biceps/brachioradialis: equal                            patella/ankle: equal          neg Babinski      Coordination:            Finger to Nose:  neg dysmetria bilaterally        Initial Functional Status Assessment :       Previous Level of Function:     · Ambulation Skills  needs device and assist  · Transfer Skills  needs device and assist  · ADL Skills  needs device and assist  · Work/Leisure Activity  needs device and assist  · Additional Comments  Pt reports living in apartment, without NURY, alone. Reports having HHA for 7 days 10a-6p. Reports using   RW and wheelchair for community access.    Cognitive Status Examination:   · Orientation  oriented to person, place, time and situation  · Level of Consciousness  alert  · Follows Commands and Answers Questions  100% of the time  · Personal Safety and Judgment  intact    Range of Motion Exam:   · Active Range of Motion Examination  no Active ROM deficits were identified; R shoulder abduction/flexion to 70 degrees    Manual Muscle Testing:   · Manual Muscle Testing Results  Grossly assessed with functional movement, bilateral UE/LE greater than or equal to 3+/5; except for R shoulder flexion/abduction 3-/5    Bed Mobility: Rolling/Turning:     · Level of [place]  moderate assist (50% patients effort)  · Physical Assist/Nonphysical Assist  1 person assist; verbal cues    Bed Mobility: Supine to Sit:     · Level of [place]  moderate assist (50% patients effort)  · Physical Assist/Nonphysical Assist  1 person assist; verbal cues    Bed Mobility Analysis:     · Bed Mobility Limitations  impaired ability to control trunk for mobility; decreased ability to use legs for bridging/pushing  · Impairments Contributing to Impaired Bed Mobility  impaired balance; narrow base of support; impaired postural control; decreased strength    Transfer: Sit to Stand:     · Level of [place]  moderate assist (50% patients effort)  · Physical Assist/Nonphysical Assist  2 person assist; verbal cues    Transfer: Stand to Sit:     · Level of [place]  moderate assist (50% patients effort)  · Physical Assist/Nonphysical Assist  2 person assist; verbal cues    Sit/Stand Transfer Safety Analysis:     · Transfer Safety Concerns Noted  decreased balance during turns; decreased step length; decreased weight-shifting ability  · Impairments Contributing to Impaired Transfers  impaired balance; narrow base of support; impaired postural control; decreased strength    Gait Skills:     · Level of [place]  maximum assist (25% patients effort)  · Physical Assist/Nonphysical Assist  2 person assist; verbal cues  · Assistive Device  BUE assist, trunk assist  · Gait Distance  5 feet from bed to chair x 1    Gait Analysis:     · Gait Deviations Noted  decreased ferdinand; decreased step length; decreased weight-shifting ability  · Impairments Contributing to Gait Deviations  impaired balance; narrow base of support; impaired postural control; decreased strength    Balance Skills Assessment:     · Sitting Balance: Static  supervision  · Sitting Balance: Dynamic  CG  · Sit-to-Stand Balance  mod A x 2  · Standing Balance: Static  mod A x 2  · Standing Balance: Dynamic  max A x 2  · Systems Impairment Contributing to Balance Disturbance  musculoskeletal  · Identified Impairments Contributing to Balance Disturbance  decreased strength; impaired postural control    Sensory Examination:   Sensory Examination:    Grossly Intact:   · Gross Sensory Examination  Grossly Intact; Left UE; Right UE; Left LE; Right LE      Clinical Impressions:   · Criteria for Skilled Therapeutic Interventions  impairments found; functional limitations in following categories; risk reduction/prevention; anticipated discharge recommendation  · Impairments Found (describe specific impairments)  gait, locomotion, and balance; muscle strength; posture  · Functional Limitations in Following Categories (describe specific limitations)  self-care; home management; community/leisure  · Risk Reduction/Prevention (Describe Specific Areas of risk reduction/prevention)  risk factors  · Risk Areas  fall    Sit/Stand Transfer Safety Analysis:     · Transfer Safety Concerns Noted  decreased weight-shifting ability  · Impairments Contributing to Impaired Transfers  impaired balance; decreased flexibility; impaired postural control; decreased strength    Balance Skills Assessment:     · Sitting Balance: Static  fair balance  · Sitting Balance: Dynamic  fair minus  · Sit-to-Stand Balance  poor balance  · Standing Balance: Static  poor balance  · Standing Balance: Dynamic  poor balance  · Systems Impairment Contributing to Balance Disturbance  musculoskeletal  · Identified Impairments Contributing to Balance Disturbance  decreased strength; impaired postural control    Sensory Examination:     Grossly Intact:   · Gross Sensory Examination  Grossly Intact      Fine Motor Coordination:     Grossly Intact:   · Grossly Intact  Left UE; Right UE; Left LE; Right LE      Upper Body Dressing Training:     · Level of Living[place]  minimum assist (75% patients effort); don back gown while seated at EOB  · Physical Assist/Nonphysical Assist  1 person assist; verbal cues; nonverbal cues (demo/gestures)    Lower Body Dressing Training:     · Level of [place]  maximum assist (25% patients effort); don R sock and don L surgical shoe while seated at EOB  · Physical Assist/Nonphysical Assist  1 person assist; verbal cues; nonverbal cues (demo/gestures)    Treatment Locations:   · Comments  Pt able to perform bed mobility, requiring Mod Ax1 2/2 impaired RUE strength and decreased LLE strength, impacting ability to weight shift. Pt performed UB/LB dressing while seated at EOB, requiring 1 person assist 2/2 impaired dynamic balance and decreased functional reach, due to impaired RUE strength and decreased postural control. Pt performed sit<->stand with Mod Ax2, and able to ambulate ~3ft from bed to chair with Max Ax2 ( b/l hand held assist ) demo impaired balance with decreased step length and impaired ability to weight shift due to impaired LLE strength. Pt left seated in chair, +all lines, +call CHRIS gray RN Bruna made aware of outcome. Pt would benefit from continued OT services to facilitate increased independence with functional mobility/ADLs.          PM&R Impression : as above    Current disposition plan recommendation :    subacute rehab placement

## 2025-07-10 ENCOUNTER — TRANSCRIPTION ENCOUNTER (OUTPATIENT)
Age: 89
End: 2025-07-10

## 2025-07-10 LAB
-  AMOXICILLIN/CLAVULANIC ACID: SIGNIFICANT CHANGE UP
-  AMPICILLIN/SULBACTAM: SIGNIFICANT CHANGE UP
-  AMPICILLIN/SULBACTAM: SIGNIFICANT CHANGE UP
-  AMPICILLIN: SIGNIFICANT CHANGE UP
-  AMPICILLIN: SIGNIFICANT CHANGE UP
-  CEFAZOLIN: SIGNIFICANT CHANGE UP
-  CEFAZOLIN: SIGNIFICANT CHANGE UP
-  CEFEPIME: SIGNIFICANT CHANGE UP
-  CEFEPIME: SIGNIFICANT CHANGE UP
-  CEFOXITIN: SIGNIFICANT CHANGE UP
-  CEFOXITIN: SIGNIFICANT CHANGE UP
-  CEFTRIAXONE: SIGNIFICANT CHANGE UP
-  CEFTRIAXONE: SIGNIFICANT CHANGE UP
-  CIPROFLOXACIN: SIGNIFICANT CHANGE UP
-  CIPROFLOXACIN: SIGNIFICANT CHANGE UP
-  GENTAMICIN: SIGNIFICANT CHANGE UP
-  GENTAMICIN: SIGNIFICANT CHANGE UP
-  LEVOFLOXACIN: SIGNIFICANT CHANGE UP
-  LEVOFLOXACIN: SIGNIFICANT CHANGE UP
-  PIPERACILLIN/TAZOBACTAM: SIGNIFICANT CHANGE UP
-  PIPERACILLIN/TAZOBACTAM: SIGNIFICANT CHANGE UP
-  TIGECYCLINE: SIGNIFICANT CHANGE UP
-  TIGECYCLINE: SIGNIFICANT CHANGE UP
-  TOBRAMYCIN: SIGNIFICANT CHANGE UP
-  TOBRAMYCIN: SIGNIFICANT CHANGE UP
-  TRIMETHOPRIM/SULFAMETHOXAZOLE: SIGNIFICANT CHANGE UP
-  TRIMETHOPRIM/SULFAMETHOXAZOLE: SIGNIFICANT CHANGE UP
ADD ON TEST-SPECIMEN IN LAB: SIGNIFICANT CHANGE UP
ANION GAP SERPL CALC-SCNC: 13 MMOL/L — SIGNIFICANT CHANGE UP (ref 5–17)
BASOPHILS # BLD AUTO: 0.04 K/UL — SIGNIFICANT CHANGE UP (ref 0–0.2)
BASOPHILS NFR BLD AUTO: 0.4 % — SIGNIFICANT CHANGE UP (ref 0–2)
BUN SERPL-MCNC: 15 MG/DL — SIGNIFICANT CHANGE UP (ref 7–23)
CALCIUM SERPL-MCNC: 9.1 MG/DL — SIGNIFICANT CHANGE UP (ref 8.4–10.5)
CHLORIDE SERPL-SCNC: 94 MMOL/L — LOW (ref 96–108)
CO2 SERPL-SCNC: 24 MMOL/L — SIGNIFICANT CHANGE UP (ref 22–31)
CREAT SERPL-MCNC: 0.56 MG/DL — SIGNIFICANT CHANGE UP (ref 0.5–1.3)
CULTURE RESULTS: ABNORMAL
EGFR: 87 ML/MIN/1.73M2 — SIGNIFICANT CHANGE UP
EGFR: 87 ML/MIN/1.73M2 — SIGNIFICANT CHANGE UP
EOSINOPHIL # BLD AUTO: 0.12 K/UL — SIGNIFICANT CHANGE UP (ref 0–0.5)
EOSINOPHIL NFR BLD AUTO: 1.1 % — SIGNIFICANT CHANGE UP (ref 0–6)
GLUCOSE SERPL-MCNC: 174 MG/DL — HIGH (ref 70–99)
GRAM STN FLD: ABNORMAL
HCT VFR BLD CALC: 34.8 % — SIGNIFICANT CHANGE UP (ref 34.5–45)
HCT VFR BLD CALC: 35.4 % — SIGNIFICANT CHANGE UP (ref 34.5–45)
HGB BLD-MCNC: 11.2 G/DL — LOW (ref 11.5–15.5)
HGB BLD-MCNC: 11.4 G/DL — LOW (ref 11.5–15.5)
IMM GRANULOCYTES # BLD AUTO: 0.04 K/UL — SIGNIFICANT CHANGE UP (ref 0–0.07)
IMM GRANULOCYTES NFR BLD AUTO: 0.4 % — SIGNIFICANT CHANGE UP (ref 0–0.9)
LYMPHOCYTES # BLD AUTO: 0.72 K/UL — LOW (ref 1–3.3)
LYMPHOCYTES NFR BLD AUTO: 6.3 % — LOW (ref 13–44)
MAGNESIUM SERPL-MCNC: 2 MG/DL — SIGNIFICANT CHANGE UP (ref 1.6–2.6)
MCHC RBC-ENTMCNC: 27.9 PG — SIGNIFICANT CHANGE UP (ref 27–34)
MCHC RBC-ENTMCNC: 28.5 PG — SIGNIFICANT CHANGE UP (ref 27–34)
MCHC RBC-ENTMCNC: 31.6 G/DL — LOW (ref 32–36)
MCHC RBC-ENTMCNC: 32.8 G/DL — SIGNIFICANT CHANGE UP (ref 32–36)
MCV RBC AUTO: 87 FL — SIGNIFICANT CHANGE UP (ref 80–100)
MCV RBC AUTO: 88.1 FL — SIGNIFICANT CHANGE UP (ref 80–100)
METHOD TYPE: SIGNIFICANT CHANGE UP
METHOD TYPE: SIGNIFICANT CHANGE UP
MONOCYTES # BLD AUTO: 1.15 K/UL — HIGH (ref 0–0.9)
MONOCYTES NFR BLD AUTO: 10.1 % — SIGNIFICANT CHANGE UP (ref 2–14)
NEUTROPHILS # BLD AUTO: 9.35 K/UL — HIGH (ref 1.8–7.4)
NEUTROPHILS NFR BLD AUTO: 81.7 % — HIGH (ref 43–77)
NRBC # BLD AUTO: 0 K/UL — SIGNIFICANT CHANGE UP (ref 0–0)
NRBC # BLD AUTO: 0 K/UL — SIGNIFICANT CHANGE UP (ref 0–0)
NRBC # FLD: 0 K/UL — SIGNIFICANT CHANGE UP (ref 0–0)
NRBC # FLD: 0 K/UL — SIGNIFICANT CHANGE UP (ref 0–0)
NRBC BLD AUTO-RTO: 0 /100 WBCS — SIGNIFICANT CHANGE UP (ref 0–0)
NRBC BLD AUTO-RTO: 0 /100 WBCS — SIGNIFICANT CHANGE UP (ref 0–0)
ORGANISM # SPEC MICROSCOPIC CNT: ABNORMAL
ORGANISM # SPEC MICROSCOPIC CNT: ABNORMAL
ORGANISM # SPEC MICROSCOPIC CNT: SIGNIFICANT CHANGE UP
PHOSPHATE SERPL-MCNC: 3.2 MG/DL — SIGNIFICANT CHANGE UP (ref 2.5–4.5)
PLATELET # BLD AUTO: 254 K/UL — SIGNIFICANT CHANGE UP (ref 150–400)
PLATELET # BLD AUTO: 257 K/UL — SIGNIFICANT CHANGE UP (ref 150–400)
PMV BLD: 10.8 FL — SIGNIFICANT CHANGE UP (ref 7–13)
PMV BLD: 11.6 FL — SIGNIFICANT CHANGE UP (ref 7–13)
POTASSIUM SERPL-MCNC: 4.3 MMOL/L — SIGNIFICANT CHANGE UP (ref 3.5–5.3)
POTASSIUM SERPL-SCNC: 4.3 MMOL/L — SIGNIFICANT CHANGE UP (ref 3.5–5.3)
RBC # BLD: 4 M/UL — SIGNIFICANT CHANGE UP (ref 3.8–5.2)
RBC # BLD: 4.02 M/UL — SIGNIFICANT CHANGE UP (ref 3.8–5.2)
RBC # FLD: 15.7 % — HIGH (ref 10.3–14.5)
RBC # FLD: 15.7 % — HIGH (ref 10.3–14.5)
SODIUM SERPL-SCNC: 131 MMOL/L — LOW (ref 135–145)
SPECIMEN SOURCE: SIGNIFICANT CHANGE UP
WBC # BLD: 11.42 K/UL — HIGH (ref 3.8–10.5)
WBC # BLD: 11.79 K/UL — HIGH (ref 3.8–10.5)
WBC # FLD AUTO: 11.42 K/UL — HIGH (ref 3.8–10.5)
WBC # FLD AUTO: 11.79 K/UL — HIGH (ref 3.8–10.5)

## 2025-07-10 PROCEDURE — 99233 SBSQ HOSP IP/OBS HIGH 50: CPT

## 2025-07-10 RX ORDER — FLUTICASONE PROPIONATE 220 UG/1
0 AEROSOL, METERED RESPIRATORY (INHALATION)
Refills: 0 | DISCHARGE

## 2025-07-10 RX ORDER — SITAGLIPTIN AND METFORMIN HYDROCHLORIDE 1000; 50 MG/1; MG/1
1 TABLET, FILM COATED, EXTENDED RELEASE ORAL
Refills: 0 | DISCHARGE

## 2025-07-10 RX ORDER — CEFTRIAXONE 500 MG/1
2 INJECTION, POWDER, FOR SOLUTION INTRAMUSCULAR; INTRAVENOUS
Qty: 0 | Refills: 0 | DISCHARGE

## 2025-07-10 RX ORDER — CEFTRIAXONE 500 MG/1
2000 INJECTION, POWDER, FOR SOLUTION INTRAMUSCULAR; INTRAVENOUS ONCE
Refills: 0 | Status: COMPLETED | OUTPATIENT
Start: 2025-07-10 | End: 2025-07-10

## 2025-07-10 RX ORDER — CEFTRIAXONE 500 MG/1
INJECTION, POWDER, FOR SOLUTION INTRAMUSCULAR; INTRAVENOUS
Refills: 0 | Status: DISCONTINUED | OUTPATIENT
Start: 2025-07-10 | End: 2025-07-11

## 2025-07-10 RX ORDER — CEFTRIAXONE 500 MG/1
2000 INJECTION, POWDER, FOR SOLUTION INTRAMUSCULAR; INTRAVENOUS EVERY 24 HOURS
Refills: 0 | Status: DISCONTINUED | OUTPATIENT
Start: 2025-07-11 | End: 2025-07-11

## 2025-07-10 RX ORDER — B1/B2/B3/B5/B6/B12/VIT C/FOLIC 500-0.5 MG
1 TABLET ORAL
Qty: 0 | Refills: 0 | DISCHARGE
Start: 2025-07-10

## 2025-07-10 RX ADMIN — GABAPENTIN 100 MILLIGRAM(S): 400 CAPSULE ORAL at 12:04

## 2025-07-10 RX ADMIN — ENOXAPARIN SODIUM 40 MILLIGRAM(S): 100 INJECTION SUBCUTANEOUS at 05:40

## 2025-07-10 RX ADMIN — CEFTRIAXONE 100 MILLIGRAM(S): 500 INJECTION, POWDER, FOR SOLUTION INTRAMUSCULAR; INTRAVENOUS at 10:18

## 2025-07-10 RX ADMIN — POLYETHYLENE GLYCOL 3350 17 GRAM(S): 17 POWDER, FOR SOLUTION ORAL at 05:40

## 2025-07-10 RX ADMIN — INSULIN LISPRO 4: 100 INJECTION, SOLUTION INTRAVENOUS; SUBCUTANEOUS at 22:52

## 2025-07-10 RX ADMIN — ATORVASTATIN CALCIUM 10 MILLIGRAM(S): 80 TABLET, FILM COATED ORAL at 22:51

## 2025-07-10 RX ADMIN — Medication 25 GRAM(S): at 09:15

## 2025-07-10 RX ADMIN — GABAPENTIN 200 MILLIGRAM(S): 400 CAPSULE ORAL at 05:40

## 2025-07-10 RX ADMIN — INSULIN LISPRO 6: 100 INJECTION, SOLUTION INTRAVENOUS; SUBCUTANEOUS at 12:55

## 2025-07-10 RX ADMIN — INSULIN LISPRO 4: 100 INJECTION, SOLUTION INTRAVENOUS; SUBCUTANEOUS at 18:16

## 2025-07-10 RX ADMIN — INSULIN LISPRO 2: 100 INJECTION, SOLUTION INTRAVENOUS; SUBCUTANEOUS at 09:27

## 2025-07-10 RX ADMIN — POLYETHYLENE GLYCOL 3350 17 GRAM(S): 17 POWDER, FOR SOLUTION ORAL at 17:05

## 2025-07-10 RX ADMIN — NYSTATIN 1 APPLICATION(S): 100000 CREAM TOPICAL at 05:40

## 2025-07-10 RX ADMIN — Medication 1 TABLET(S): at 12:03

## 2025-07-10 RX ADMIN — NYSTATIN 1 APPLICATION(S): 100000 CREAM TOPICAL at 17:04

## 2025-07-10 RX ADMIN — GABAPENTIN 200 MILLIGRAM(S): 400 CAPSULE ORAL at 17:06

## 2025-07-10 RX ADMIN — Medication 2 TABLET(S): at 22:51

## 2025-07-10 RX ADMIN — ESCITALOPRAM OXALATE 20 MILLIGRAM(S): 20 TABLET ORAL at 12:03

## 2025-07-10 RX ADMIN — Medication 25 GRAM(S): at 01:23

## 2025-07-10 NOTE — PROGRESS NOTE ADULT - SUBJECTIVE AND OBJECTIVE BOX
*****INCOMPLETE NOTE*****    INTERVAL HPI/OVERNIGHT EVENTS:    SUBJECTIVE: Patient seen and examined at bedside, resting comfortably in bed, and does not appear to be in any acute distress. Patient reports    Vital Signs Last 24 Hrs  T(C): 36.8 (09 Jul 2025 05:11), Max: 37 (08 Jul 2025 13:00)  T(F): 98.2 (09 Jul 2025 05:11), Max: 98.6 (08 Jul 2025 13:00)  HR: 83 (09 Jul 2025 05:11) (83 - 95)  BP: 131/68 (09 Jul 2025 05:11) (107/53 - 131/76)  BP(mean): 89 (09 Jul 2025 05:11) (71 - 89)  RR: 17 (09 Jul 2025 05:11) (17 - 18)  SpO2: 95% (09 Jul 2025 05:11) (94% - 96%)    Parameters below as of 09 Jul 2025 05:11  Patient On (Oxygen Delivery Method): room air        PHYSICAL EXAM:  General: in no acute distress  Eyes: EOMI intact bilaterally. Anicteric sclerae, moist conjunctivae  HENT: Moist mucous membranes  Neck: Trachea midline, supple  Lungs: CTA B/L. No wheezes, rales, or rhonchi  Cardiovascular: RRR. No murmurs, rubs, or gallops  Abdomen: Soft, non-tender non-distended; No rebound or guarding  Extremities: WWP, No clubbing, cyanosis or edema  Neurological: Alert and oriented  Skin: Warm and dry. No obvious rash     LABS:                        11.2   8.11  )-----------( 226      ( 09 Jul 2025 07:10 )             35.1     07-09    130[L]  |  96  |  14  ----------------------------<  128[H]  4.3   |  24  |  0.55    Ca    8.5      09 Jul 2025 07:10  Phos  3.5     07-09  Mg     2.0     07-09    TPro  8.1  /  Alb  3.4  /  TBili  0.8  /  DBili  x   /  AST  28  /  ALT  23  /  AlkPhos  91  07-07   89yo F with DM, depression, HLD, s/p pacemaker, diabetic neuropathy, c/o infection to left big toe with maggot on it for at least a month. She is unsure when the wound first started. Noted to have L great toe nail avulsion with maggots present in wound. She lived alone up until 1 month ago, but has HHA now 10a-6p daily. Reports she has had increasing difficulty ambulating around her apartment, had a fall 1 wk ago and felt a pop in her right arm, has had decreased ROM since the fall. Reports she had outpatient imaging done without fx or dislocation. No headstrike/LOC. Otherwise denies any fevers, chills, N/V/D, abdominal pain, chest pain or SOB.   has received Vancomycine i89yo F with DM, depression, HLD, s/p pacemaker, diabetic neuropathy, c/o infection to left big toe with maggot on it for at least a month. She is unsure when the wound first started. Noted to have L great toe nail avulsion with maggots present in wound. She lived alone up until 1 month ago, but has HHA now 10a-6p daily. Reports she has had increasing difficulty ambulating around her apartment, had a fall 1 wk ago and felt a pop in her right arm, has had decreased ROM since the fall. Reports she had outpatient imaging done without fx or dislocation. No headstrike/LOC. Otherwise denies any fevers, chills, N/V/D, abdominal pain, chest pain or SOB.   Bx: E Coli. CT LLE with IV contrast: No evidence of osteomyelitis and she is currently on Zosyn, Nystatin powder.     INTERVAL EVENTS: no significant overnight events. WBC uptrending. Patient not d/c today due to monitoring for other signs of infection. Podiatry not concerned about L big toe being infected.     SUBJECTIVE: Patient endorses getting poor sleep due to lights being on. Denies pain. Reports being able to urinate without discomfort.       Vital Signs Last 24 Hrs  T(C): 36.8 (09 Jul 2025 05:11), Max: 37 (08 Jul 2025 13:00)  T(F): 98.2 (09 Jul 2025 05:11), Max: 98.6 (08 Jul 2025 13:00)  HR: 83 (09 Jul 2025 05:11) (83 - 95)  BP: 131/68 (09 Jul 2025 05:11) (107/53 - 131/76)  BP(mean): 89 (09 Jul 2025 05:11) (71 - 89)  RR: 17 (09 Jul 2025 05:11) (17 - 18)  SpO2: 95% (09 Jul 2025 05:11) (94% - 96%)    Parameters below as of 09 Jul 2025 05:11  Patient On (Oxygen Delivery Method): room air        PHYSICAL EXAM:  General: in no acute distress  Eyes: EOMI intact bilaterally. Anicteric sclerae, moist conjunctivae  HENT: Moist mucous membranes  Neck: Trachea midline, supple  Lungs: CTA B/L. No wheezes, rales, or rhonchi  Cardiovascular: RRR. No murmurs, rubs, or gallops  Abdomen: Soft, non-tender non-distended; No rebound or guarding  Extremities: WWP, No clubbing, cyanosis or edema. L foot wrapped. Cleaned by podiatry today.   Neurological: Alert and oriented  Skin: Warm and dry. No obvious rash       LABS:                        11.2   11.42 )-----------( 254      ( 10 Jul 2025 11:59 )             35.4     07-10    131[L]  |  94[L]  |  15  ----------------------------<  174[H]  4.3   |  24  |  0.56    Ca    9.1      10 Jul 2025 05:30  Phos  3.2     07-10  Mg     2.0     07-10        Urinalysis Basic - ( 10 Jul 2025 05:30 )    Color: x / Appearance: x / SG: x / pH: x  Gluc: 174 mg/dL / Ketone: x  / Bili: x / Urobili: x   Blood: x / Protein: x / Nitrite: x   Leuk Esterase: x / RBC: x / WBC x   Sq Epi: x / Non Sq Epi: x / Bacteria: x

## 2025-07-10 NOTE — DISCHARGE NOTE PROVIDER - CARE PROVIDER_API CALL
Cristian morrissey  [address] [place]  [address]  Phone: [phone]  Fax: [phone]  Follow Up Time: 1 week

## 2025-07-10 NOTE — DISCHARGE NOTE PROVIDER - NSDCMRMEDTOKEN_GEN_ALL_CORE_FT
cefTRIAXone 2 g injection: 2 gram(s) intravenously once a day last dose 7/14/25  escitalopram 20 mg oral tablet: 1 tab(s) orally once a day  gabapentin 100 mg oral capsule: 1 cap(s) orally 3 times a day  metformin-sitagliptin:   mirabegron 50 mg oral tablet, extended release: 1 tab(s) orally once a day  Multiple Vitamins oral tablet: 1 tab(s) orally once a day  Repaglinide: 1 milligram(s) once a day  simvastatin 20 mg oral tablet: 1 tab(s) orally once a day (at bedtime)   cefpodoxime 200 mg oral tablet: 1 tab(s) orally 2 times a day start after ceftriaxone is completed: take on 7/15, 7/16, 7/17  cefTRIAXone 2 g injection: 2 gram(s) intravenously once a day last dose 7/14/25  escitalopram 20 mg oral tablet: 1 tab(s) orally once a day  gabapentin 100 mg oral capsule: 1 cap(s) orally 3 times a day  Janumet XR 50 mg-500 mg oral tablet, extended release: 1 tab(s) orally once a day  mirabegron 50 mg oral tablet, extended release: 1 tab(s) orally once a day  Multiple Vitamins oral tablet: 1 tab(s) orally once a day  Repaglinide: 1 milligram(s) once a day  simvastatin 20 mg oral tablet: 1 tab(s) orally once a day (at bedtime)

## 2025-07-10 NOTE — PROGRESS NOTE ADULT - PROBLEM SELECTOR PLAN 1
Pt presenting wound to left great toe, present >1 month  Patient remains afebrile. Elevated ESR (41) and CRP (29.1). Blood cultures revealed gram negative rods.   Podiatry consulted.    ED COURSE:  Denies any recollection of injury to her toe, although notes fall 1 week ago  Afebrile in ED, HR 90's, WBC 10.5.  CT RLE: No evidence of osteomyelitis.  Fat stranding, ill-defined fluid, edema, and/or cellulitis. No gas, no fluid seen.     -Initiate Zosyn for cellulitis  -Discontinue Cefazolin  -Appreciate podiatry recs  -low concern for MRSA risk factors (no recent hosp, coming from home, reports A1c last month 6.3)  -F/U E.Coli susceptibilities Pt presenting wound to left great toe, present >1 month  Patient remains afebrile. Elevated ESR (41) and CRP (29.1). Blood cultures revealed gram negative rods.   Podiatry consulted.  Susceptible to E. Coli    ED COURSE:  Denies any recollection of injury to her toe, although notes fall 1 week ago  Afebrile in ED, HR 90's, WBC 10.5.  CT RLE: No evidence of osteomyelitis.  Fat stranding, ill-defined fluid, edema, and/or cellulitis. No gas, no fluid seen.     -Ceftriaxone 2g q24 for 7 days  - Cefpodoxime for additional 3 days after ceftriaxone course  -Discontinue Cefazolin  -Appreciate podiatry recs  -low concern for MRSA risk factors (no recent hosp, coming from home, reports A1c last month 6.3)

## 2025-07-10 NOTE — DISCHARGE NOTE NURSING/CASE MANAGEMENT/SOCIAL WORK - NSDCPEFALRISK_GEN_ALL_CORE
For information on Fall & Injury Prevention, visit: [email or web address] OR  [email or web address] OR  https://www.cdc.gov/steadi/patient.html

## 2025-07-10 NOTE — PROGRESS NOTE ADULT - ASSESSMENT
89yo F with DM, depression, HLD, s/p pacemaker, diabetic neuropathy presented to the ED with a chronic left great toe wound infested with maggots, present for at least one month. S/p total nail avulsion bedside 7/8. No maggots present after procedure. CT reviewed, no gas, no abscess or fluid collection in foot, no fracture or dislocation. No leukocytosis. ESR elevated to 41. Clinically, low suspicion of OM. Wound culture taken. Will treat as SSTI.    Plan  -F/u wound cx  -c/w IV abx  -Foot dressed with betadine, adaptic, kerlix and ACE wrap  -WBAT  -Rest of care up to primary team    Discharge dressing instructions: Cleanse wound with normal sailine daily, pat dry with sterile guaze, apply bacitracin gauze to wound base, cover with dry sterile gauze, ABD pad, wrap with Kerlix and light ACE bandage.     Follow up out-patient within 1 week of discharge:  [facility]   Address: [facility] [address] [place] [address]  Phone: [phone]    Podiatry will follow, plan discussed with attending.

## 2025-07-10 NOTE — DISCHARGE NOTE PROVIDER - NSDCCPCAREPLAN_GEN_ALL_CORE_FT
PRINCIPAL DISCHARGE DIAGNOSIS  Diagnosis: Cellulitis of great toe of left foot  Assessment and Plan of Treatment: Cellulitis ([id number]-z-IWT-tis) is a spreading skin infection, most commonly of the lower leg. It's caused by bacteria entering through a break in the skin. The affected skin is swollen, painful and warm to the touch. The infection can cause a fever and become very serious, involving deeper tissues. The condition often clears up with antibiotic medicine.  During your hospitalization, you were given a several day course of antibiotics. You will continue your antibiotic regimen after discharge for additional 7 days. 4 days of your antibiotics with be administered intravenously. After completion of your intravenous antibiotics, you will continue with oral antibiotics for 3 days for your cellulitis.   - Follow up with your PCP within a week of discharge.      SECONDARY DISCHARGE DIAGNOSES  Diagnosis: Bacteremia  Assessment and Plan of Treatment: Bacteremia is the presence of bacteria in your blood. If you have symptoms, they’re usually mild. Bacteria can enter your blood in different ways, including wounds, medical procedures and even brushing or flossing your teeth too hard. A healthcare provider can diagnose bacteremia with bacterial cultures and prescribe antibiotics to treat it.   During your hospitalization, you had Blood cultures drawn given you presented with cellulitis of your great toe. Your blood cultures were positive for E. Coli. Given your positive blood cultures, you required intravenous antibiotics. You will continue these intravenous antibiotics for an additional 4 days after discharge.   - Follow up with your PCP within a week of discharge.     PRINCIPAL DISCHARGE DIAGNOSIS  Diagnosis: Cellulitis of great toe of left foot  Assessment and Plan of Treatment: Cellulitis (ymd-z-VUW-tis) is a spreading skin infection, most commonly of the lower leg. It's caused by bacteria entering through a break in the skin. The affected skin is swollen, painful and warm to the touch. The infection can cause a fever and become very serious, involving deeper tissues. The condition often clears up with antibiotic medicine.  During your hospitalization, you were given a several day course of antibiotics. You will continue your antibiotic regimen after discharge for additional 7 days. 4 days of your antibiotics with be administered intravenously. After completion of your intravenous antibiotics, you will continue with oral antibiotics for 3 days for your cellulitis.   - Follow up with your PCP within a week of discharge.  - Follow up with your Podiatrist within a week of discharge.  - Please continue to clean your Left big toe as follows: Cleanse wound with normal sailine daily, pat dry with sterile guaze, apply bacitracin gauze to wound base, cover with dry sterile gauze, ABD pad, wrap with Kerlix and light ACE bandage.         SECONDARY DISCHARGE DIAGNOSES  Diagnosis: Bacteremia  Assessment and Plan of Treatment: Bacteremia is the presence of bacteria in your blood. If you have symptoms, they’re usually mild. Bacteria can enter your blood in different ways, including wounds, medical procedures and even brushing or flossing your teeth too hard. A healthcare provider can diagnose bacteremia with bacterial cultures and prescribe antibiotics to treat it.   During your hospitalization, you had Blood cultures drawn given you presented with cellulitis of your great toe. Your blood cultures were positive for E. Coli. Given your positive blood cultures, you required intravenous antibiotics. You will continue these intravenous antibiotics for an additional 4 days after discharge.   - Follow up with your PCP within a week of discharge.     PRINCIPAL DISCHARGE DIAGNOSIS  Diagnosis: Cellulitis of great toe of left foot  Assessment and Plan of Treatment: Cellulitis (gbz-s-CSP-tis) is a spreading skin infection, most commonly of the lower leg. It's caused by bacteria entering through a break in the skin. The affected skin is swollen, painful and warm to the touch. The infection can cause a fever and become very serious, involving deeper tissues. The condition often clears up with antibiotic medicine.  During your hospitalization, you were given a several day course of antibiotics. You will continue your antibiotic regimen after discharge for additional 7 days. 4 days of your antibiotics with be administered intravenously. After completion of your intravenous antibiotics, you will continue with oral antibiotics for 3 days for your cellulitis.   - Follow up with your PCP within a week of discharge.  - Follow up with your Podiatrist within a week of discharge.  - Please continue to clean your Left big toe as follows: Cleanse wound with normal sailine daily, pat dry with sterile guaze, apply bacitracin gauze to wound base, cover with dry sterile gauze, ABD pad, wrap with Kerlix and light ACE bandage.   - Please return to the hospital if you have fevers, notice maggots or any other insects on your big toe, notice a change in color in your toe (i.e. black).        SECONDARY DISCHARGE DIAGNOSES  Diagnosis: Bacteremia  Assessment and Plan of Treatment: Bacteremia is the presence of bacteria in your blood. If you have symptoms, they’re usually mild. Bacteria can enter your blood in different ways, including wounds, medical procedures and even brushing or flossing your teeth too hard. A healthcare provider can diagnose bacteremia with bacterial cultures and prescribe antibiotics to treat it.   During your hospitalization, you had Blood cultures drawn given you presented with cellulitis of your great toe. Your blood cultures were positive for E. Coli. Given your positive blood cultures, you required intravenous antibiotics. You will continue these intravenous antibiotics for an additional 4 days after discharge.   - Follow up with your PCP within a week of discharge.  - Please continue your antibiotics  - Please return to the hospital if you have fevers greater than 100.3F, experience chills, low blood pressure, or feel very ill.     PRINCIPAL DISCHARGE DIAGNOSIS  Diagnosis: Cellulitis of great toe of left foot  Assessment and Plan of Treatment: Cellulitis (vlx-v-JQQ-tis) is a spreading skin infection, most commonly of the lower leg. It's caused by bacteria entering through a break in the skin. The affected skin is swollen, painful and warm to the touch. The infection can cause a fever and become very serious, involving deeper tissues. The condition often clears up with antibiotic medicine.  During your hospitalization, you were given a several day course of antibiotics. You will continue your antibiotic regimen after discharge for additional 7 days. 4 days of your antibiotics with be administered intravenously with ceftriaxone 2g until 7/14. On 7/15 you will take 3 days of oral antibiotics with cefpodoxime 200mg twice a day (last day 7/17). After completion of your intravenous antibiotics, you will continue with oral antibiotics for 3 days for your cellulitis.   - Follow up with your PCP within a week of discharge.  - Follow up with your Podiatrist within a week of discharge.  - Please continue to clean your Left big toe as follows: Cleanse wound with normal sailine daily, pat dry with sterile guaze, apply bacitracin gauze to wound base, cover with dry sterile gauze, ABD pad, wrap with Kerlix and light ACE bandage.   - Please return to the hospital if you have fevers, notice maggots or any other insects on your big toe, notice a change in color in your toe (i.e. black).        SECONDARY DISCHARGE DIAGNOSES  Diagnosis: Bacteremia  Assessment and Plan of Treatment: Bacteremia is the presence of bacteria in your blood. If you have symptoms, they’re usually mild. Bacteria can enter your blood in different ways, including wounds, medical procedures and even brushing or flossing your teeth too hard. A healthcare provider can diagnose bacteremia with bacterial cultures and prescribe antibiotics to treat it.   During your hospitalization, you had Blood cultures drawn given you presented with cellulitis of your great toe. Your blood cultures were positive for E. Coli. Given your positive blood cultures, you required intravenous antibiotics. You will continue these intravenous antibiotics for an additional 4 days after discharge.   - Follow up with your PCP within a week of discharge.  - Please continue your antibiotics  - Please return to the hospital if you have fevers greater than 100.3F, experience chills, low blood pressure, or feel very ill.     PRINCIPAL DISCHARGE DIAGNOSIS  Diagnosis: Cellulitis of great toe of left foot  Assessment and Plan of Treatment: Cellulitis is an infection of the skin, most commonly of the lower leg. It's caused by bacteria entering through a break in the skin. The infection can cause a fever and become very serious, involving deeper tissues.   During your hospitalization, you were given a several day course of antibiotics. Your will be discharged with 2 antibiotics - Ceftrixone 2g every 24 hours intravenous and Cefpodoxime 200mg 1 tablet by mouth 2 times a day. Upon discharge, you will continue your Ceftriaxone for the next 3 days, with your last dose being on 7/14.  On 7/15 you will start your Cefpodoxime for an additional 3 days of antibiotics.   - Follow up with your PCP within a week of discharge.  - Follow up with your Podiatrist within a week of discharge.  - Please continue to clean your Left big toe as follows: Cleanse wound with normal sailine daily, pat dry with sterile guaze, apply bacitracin gauze to wound base, cover with dry sterile gauze, ABD pad, wrap with Kerlix and light ACE bandage.   - Please return to the hospital if you have fevers, notice maggots or any other insects on your big toe, notice a change in color in your toe (i.e. black).        SECONDARY DISCHARGE DIAGNOSES  Diagnosis: Bacteremia  Assessment and Plan of Treatment: Bacteremia is the presence of bacteria in your blood. Bacteria can enter your blood in different ways, including wounds, medical procedures and even brushing or flossing your teeth too hard.    During your hospitalization, you had Blood cultures drawn given you presented with cellulitis of your great toe. Your blood cultures were positive for E. Coli. Given your positive blood cultures, you required an intravenous antibiotic called Ceftrixone. You will continue this intravenous antibiotic for an additional 3 days after discharge, with completion of the last dose on 7/14. Afterwards, you will start your oral antibiotic for your cellulitis, as previously described.    - Follow up with your PCP within a week of discharge.  - Please continue your antibiotics   - Please return to the hospital if you have fevers greater than 100.3F, experience chills, low blood pressure, or feel very ill.     PRINCIPAL DISCHARGE DIAGNOSIS  Diagnosis: Cellulitis of great toe of left foot  Assessment and Plan of Treatment: Cellulitis is an infection of the skin, most commonly of the lower leg. It's caused by bacteria entering through a break in the skin. The infection can cause a fever and become very serious, involving deeper tissues.   During your hospitalization, you were given a several day course of antibiotics. Your will be discharged with 2 antibiotics - Ceftrixone 2g every 24 hours intravenous and Cefpodoxime 200mg 1 tablet by mouth 2 times a day. Upon discharge, you will continue your Ceftriaxone for the next 3 days, with your last dose being on 7/14.  On 7/15 you will start your Cefpodoxime for an additional 3 days of antibiotics.   - Follow up with your PCP within a week of discharge.  - Follow up with your Podiatrist within a week of discharge.  - Please continue to clean your Left big toe as follows: Cleanse wound with normal sailine daily, pat dry with sterile guaze, apply bacitracin gauze to wound base, cover with dry sterile gauze, ABD pad, wrap with Kerlix and light ACE bandage.   - Please return to the hospital if you have fevers, notice maggots or any other insects on your big toe, notice a change in color in your toe (i.e. black).        SECONDARY DISCHARGE DIAGNOSES  Diagnosis: Bacteremia  Assessment and Plan of Treatment: Bacteremia is the presence of bacteria in your blood. Bacteria can enter your blood in different ways, including wounds, medical procedures and even brushing or flossing your teeth too hard.    During your hospitalization, you had Blood cultures drawn given you presented with cellulitis of your great toe. Your blood cultures were positive for E. Coli. Given your positive blood cultures, you required an intravenous antibiotic called Ceftrixone. You will continue this intravenous antibiotic for an additional 3 days after discharge, with completion of the last dose on 7/14. Afterwards, you will start your oral antibiotic for your cellulitis, as previously described.    - Follow up with your PCP within a week of discharge.  - Please continue your antibiotics   - Please return to the hospital if you have fevers greater than 100.3F, experience chills, low blood pressure, or feel very ill.    Diagnosis: Metabolic alkalosis  Assessment and Plan of Treatment:

## 2025-07-10 NOTE — DISCHARGE NOTE NURSING/CASE MANAGEMENT/SOCIAL WORK - NSDCVIVACCINE_GEN_ALL_CORE_FT
Tdap; 17-May-2021 19:11; Jacqui Barker (RN); [employer]; [id number] (Exp. Date: 09-Apr-2022); IntraMuscular; Deltoid Left.; 0.5 milliLiter(s); VIS (VIS Published: 09-May-2013, VIS Presented: 17-May-2021);

## 2025-07-10 NOTE — DISCHARGE NOTE PROVIDER - PROVIDER TOKENS
FREE:[LAST:[ghanshyam],FIRST:[Cristian],PHONE:[phone]],FAX:[phone]],ADDRESS:[[address] [place] [address]],FOLLOWUP:[1 week]]

## 2025-07-10 NOTE — DISCHARGE NOTE NURSING/CASE MANAGEMENT/SOCIAL WORK - PATIENT PORTAL LINK FT
You can access the FollowMyHealth Patient Portal offered by [facility] by registering at the following website: [email or web address]. By joining [employer]’s FollowMyHealth portal, you will also be able to view your health information using other applications (apps) compatible with our system.

## 2025-07-10 NOTE — DISCHARGE NOTE NURSING/CASE MANAGEMENT/SOCIAL WORK - FINANCIAL ASSISTANCE
[facility] provides services at a reduced cost to those who are determined to be eligible through [facility]’s financial assistance program. Information regarding [facility]’s financial assistance program can be found by going to [email or web address] or by calling [phone].

## 2025-07-10 NOTE — DISCHARGE NOTE PROVIDER - NSDCFUADDAPPT_GEN_ALL_CORE_FT
Follow up out-patient within 1 week of discharge:  [facility]   Address: [address] [place] [address]  Phone: [phone]

## 2025-07-10 NOTE — PROGRESS NOTE ADULT - SUBJECTIVE AND OBJECTIVE BOX
Patient is a 89y old  Female who presents with a chief complaint of L great toe cellulitis (10 Jul 2025 07:01)      INTERVAL HPI/ OVERNIGHT EVENTS  O/n b/l Duplex Venous doppler which was negative for DVT. Pt seen and examined bedside. Foot dressing C/D/I. Denies any foot pain. No acute pedal complaints.    LABS                        11.2   8.11  )-----------( 226      ( 09 Jul 2025 07:10 )             35.1     07-09    133[L]  |  96  |  12  ----------------------------<  143[H]  4.2   |  27  |  0.60    Ca    8.4      09 Jul 2025 15:33  Phos  3.5     07-09  Mg     2.0     07-09          ICU Vital Signs Last 24 Hrs  T(C): 37.2 (10 Jul 2025 05:45), Max: 37.4 (09 Jul 2025 21:05)  T(F): 98.9 (10 Jul 2025 05:45), Max: 99.3 (09 Jul 2025 21:05)  HR: 87 (10 Jul 2025 05:45) (87 - 92)  BP: 116/63 (10 Jul 2025 05:45) (109/58 - 116/63)  BP(mean): 75 (09 Jul 2025 21:05) (75 - 78)  ABP: --  ABP(mean): --  RR: 18 (10 Jul 2025 05:50) (17 - 18)  SpO2: 93% (10 Jul 2025 05:50) (90% - 94%)    O2 Parameters below as of 10 Jul 2025 05:50  Patient On (Oxygen Delivery Method): room air            RADIOLOGY  < from: CT Lower Extremity w/ IV Cont, Left (07.07.25 @ 21:08) >    IMPRESSION:  1.   No evidence of osteomyelitis.  Moderate subcutaneous fat  stranding/ill-defined fluid, edema and/or cellulitis. No soft tissue gas.   No  loculated fluid collection seen.  2.   Moderate knee joint effusion. Severe knee osteoarthritis.    --- End of Report ---    < end of copied text >      MICROBIOLOGY  Culture - Wound Aerobic/Anaerobic (07.08.25 @ 13:23)    Gram Stain:   No polymorphonuclear cells seen per low power field  No organisms seen per oil power field   Specimen Source: Skin/Wound Skin/Wound   Culture Results:   Few Escherichia coli      PHYSICAL EXAM  Lower Extremity Focused  Vasc:  Derm:  Neuro:  MSK: Patient is a 89y old  Female who presents with a chief complaint of L great toe cellulitis (10 Jul 2025 07:01)      INTERVAL HPI/ OVERNIGHT EVENTS  O/n b/l Duplex Venous doppler which was negative for DVT. Pt seen and examined bedside. Foot dressing C/D/I. Denies any foot pain. No acute pedal complaints.    LABS                        11.2   8.11  )-----------( 226      ( 09 Jul 2025 07:10 )             35.1     07-09    133[L]  |  96  |  12  ----------------------------<  143[H]  4.2   |  27  |  0.60    Ca    8.4      09 Jul 2025 15:33  Phos  3.5     07-09  Mg     2.0     07-09          ICU Vital Signs Last 24 Hrs  T(C): 37.2 (10 Jul 2025 05:45), Max: 37.4 (09 Jul 2025 21:05)  T(F): 98.9 (10 Jul 2025 05:45), Max: 99.3 (09 Jul 2025 21:05)  HR: 87 (10 Jul 2025 05:45) (87 - 92)  BP: 116/63 (10 Jul 2025 05:45) (109/58 - 116/63)  BP(mean): 75 (09 Jul 2025 21:05) (75 - 78)  ABP: --  ABP(mean): --  RR: 18 (10 Jul 2025 05:50) (17 - 18)  SpO2: 93% (10 Jul 2025 05:50) (90% - 94%)    O2 Parameters below as of 10 Jul 2025 05:50  Patient On (Oxygen Delivery Method): room air            RADIOLOGY  < from: CT Lower Extremity w/ IV Cont, Left (07.07.25 @ 21:08) >    IMPRESSION:  1.   No evidence of osteomyelitis.  Moderate subcutaneous fat  stranding/ill-defined fluid, edema and/or cellulitis. No soft tissue gas.   No  loculated fluid collection seen.  2.   Moderate knee joint effusion. Severe knee osteoarthritis.    --- End of Report ---    < end of copied text >      MICROBIOLOGY  Culture - Wound Aerobic/Anaerobic (07.08.25 @ 13:23)    Gram Stain:   No polymorphonuclear cells seen per low power field  No organisms seen per oil power field   Specimen Source: Skin/Wound Skin/Wound   Culture Results:   Few Escherichia coli      PHYSICAL EXAM  Lower Extremity Focused  Vasc: 2+ DP/PT L. No edema. No erythema.  Derm: S/p L hallucal total nail avulsion with granular nail bed. No edema. No erythema. No drainage. (-) PTB. No malodor. No fluctuance.  Neuro: Diminished sensation L  MSK: (-) TTP to L hallucal nail bed

## 2025-07-10 NOTE — DISCHARGE NOTE PROVIDER - ATTENDING DISCHARGE PHYSICAL EXAMINATION:
GENERAL: NAD, lying in bed comfortably  HEAD:  Atraumatic, Normocephalic  EYES: EOMI, PERRLA, conjunctiva and sclera clear  ENT: Moist mucous membranes  NECK: Supple, No JVD  CHEST/LUNG: Clear to auscultation bilaterally; No rales, rhonchi, wheezing, or rubs. Unlabored respirations  HEART: Regular rate and rhythm; No murmurs, rubs, or gallops  ABDOMEN: BSx4; Soft, nontender, nondistended  EXTREMITIES:  No clubbing, cyanosis, or edema; L great toe cleaned and wrapped by podiatry  NERVOUS SYSTEM:  A&Ox3, no focal deficits   SKIN: No rashes or lesions

## 2025-07-10 NOTE — DISCHARGE NOTE PROVIDER - HOSPITAL COURSE
#Discharge: do not delete    ROWENA LOYD is a 89yo F with DM, depression, HLD, s/p pacemaker, diabetic neuropathy, c/o infection to left big toe with maggot on it for at least a month. She is unsure when the wound first started. Noted to have L great toe nail avulsion with maggots present in wound. She lived alone up until 1 month ago, but has HHA now 10a-6p daily. Reports she has had increasing difficulty ambulating around her apartment, had a fall 1 wk ago and felt a pop in her right arm, has had decreased ROM since the fall. Reports she had outpatient imaging done without fx or dislocation. No headstrike/LOC. Otherwise denies any fevers, chills, N/V/D, abdominal pain, chest pain or SOB.     Bx: E Coli. CT LLE with IV contrast: No evidence of osteomyelitis and she is currently on Zosyn, Nystatin powder.     Presented with left big toe infection with maggot, found to have E Coli cellulitis of the left great toe and E. Coli bacteremia. CT Scan of the LLE w/ IV contrast was performed without any evidence of osteomyelitis. Patient received 1 dose of vancomycin and 1 dose of Zosyn on 7/7/25. Given culture results, Abx coverage narrowed to Zosyn pending culture sensitivities. On 7/10, cultures showed sensitivities to ceftriaxone, so Abx course further narrowed to Ceftriaxone 2g daily. Patient to complete total of 7 day course of IV Abx for bacteremia, followed by 3 additional day course of Cefpodoxime for cellulitis of left great toe.     Problem List/Main Diagnoses (system-based):   #     #     #    Patient was discharged to: (home/[facility]/acute rehab/hospice, etc. and w/ home health/home PT/RN/home O2)    New medications:   Changes to old medications:  Medications that were stopped:    Items to follow up as outpatient:    Physical exam at the time of discharge:       LABS & STUDIES:   #Discharge: do not delete    ROWENA LOYD is a 89yo F with DM, depression, HLD, s/p pacemaker, diabetic neuropathy, c/o infection to left big toe with maggot on it for at least a month. She is unsure when the wound first started. Noted to have L great toe nail avulsion with maggots present in wound. She lived alone up until 1 month ago, but has HHA now 10a-6p daily. Reports she has had increasing difficulty ambulating around her apartment, had a fall 1 wk ago and felt a pop in her right arm, has had decreased ROM since the fall. Reports she had outpatient imaging done without fx or dislocation. No headstrike/LOC. Otherwise denies any fevers, chills, N/V/D, abdominal pain, chest pain or SOB.     Bx: E Coli. CT LLE with IV contrast: No evidence of osteomyelitis and she is currently on Zosyn, Nystatin powder.     Presented with left big toe infection with maggot, found to have E Coli cellulitis of the left great toe and E. Coli bacteremia. CT Scan of the LLE w/ IV contrast was performed without any evidence of osteomyelitis. Patient received 1 dose of vancomycin and 1 dose of Zosyn on 7/7/25. Given culture results, Abx coverage narrowed to Zosyn pending culture sensitivities. On 7/10, cultures showed sensitivities to ceftriaxone, so Abx course further narrowed to Ceftriaxone 2g daily. Patient to complete total of 7 day course of IV Ceftriaxone for bacteremia, followed by 3 additional day course of Cefpodoxime for cellulitis of left great toe.     Problem List/Main Diagnoses (system-based):   # Bacteremia  E. Coli Bacteremia on 7/7 blood cultures. Likely secondary to left great toe cellulitis. Initially patient on Zosyn, but given sensitivity to ceftriaxone, patient put on 7 day course of ceftriaxone.   - Repeat blood cultures showing no growth to date.   - Continue 7-day course of IV Abx (7/8-7/14)    # Cellulitis  Cellulitis - E.Coli positive.   - Continue IV antibiotics for bacteremia, followed by 3 day course of cefpodoxime.     Patient was discharged to: [facility]    New medications: Ceftriaxone; cefpodoxime   Changes to old medications: none  Medications that were stopped: none    Items to follow up as outpatient:     Physical exam at the time of discharge:   VITALS:   T(C): 37.7 (07-10-25 @ 10:26), Max: 37.7 (07-10-25 @ 10:26)  HR: 87 (07-10-25 @ 05:45) (87 - 92)  BP: 116/63 (07-10-25 @ 05:45) (109/58 - 116/63)  RR: 18 (07-10-25 @ 05:50) (17 - 18)  SpO2: 93% (07-10-25 @ 05:50) (90% - 94%)    GENERAL: NAD, lying in bed comfortably  HEAD:  Atraumatic, Normocephalic  EYES: EOMI, PERRLA, conjunctiva and sclera clear  ENT: Moist mucous membranes  NECK: Supple, No JVD  CHEST/LUNG: Clear to auscultation bilaterally; No rales, rhonchi, wheezing, or rubs. Unlabored respirations  HEART: Regular rate and rhythm; No murmurs, rubs, or gallops  ABDOMEN: BSx4; Soft, nontender, nondistended  EXTREMITIES:  No clubbing, cyanosis, or edema; L great toe cleaned and wrapped by podiatry  NERVOUS SYSTEM:  A&Ox3, no focal deficits   SKIN: No rashes or lesions      LABS & STUDIES:  LABS:                        11.4   11.79 )-----------( 257      ( 10 Jul 2025 05:30 )             34.8     07-10    131[L]  |  94[L]  |  15  ----------------------------<  174[H]  4.3   |  24  |  0.56    Ca    9.1      10 Jul 2025 05:30  Phos  3.2     07-10  Mg     2.0     07-10        Urinalysis Basic - ( 10 Jul 2025 05:30 )    Color: x / Appearance: x / SG: x / pH: x  Gluc: 174 mg/dL / Ketone: x  / Bili: x / Urobili: x   Blood: x / Protein: x / Nitrite: x   Leuk Esterase: x / RBC: x / WBC x   Sq Epi: x / Non Sq Epi: x / Bacteria: x       #Discharge: do not delete    ROWENA LOYD is a 89yo F with DM, depression, HLD, s/p pacemaker, diabetic neuropathy, c/o infection to left big toe with maggot on it for at least a month. She is unsure when the wound first started. Noted to have L great toe nail avulsion with maggots present in wound. She lived alone up until 1 month ago, but has HHA now 10a-6p daily. Reports she has had increasing difficulty ambulating around her apartment, had a fall 1 wk ago and felt a pop in her right arm, has had decreased ROM since the fall. Reports she had outpatient imaging done without fx or dislocation. No headstrike/LOC. Otherwise denies any fevers, chills, N/V/D, abdominal pain, chest pain or SOB.     Bx: E Coli. CT LLE with IV contrast: No evidence of osteomyelitis and she is currently on Zosyn, Nystatin powder.     Presented with left big toe infection with maggot, found to have E Coli cellulitis of the left great toe and E. Coli bacteremia. CT Scan of the LLE w/ IV contrast was performed without any evidence of osteomyelitis. Patient received 1 dose of vancomycin and 1 dose of Zosyn on 7/7/25. Given culture results, Abx coverage narrowed to Zosyn pending culture sensitivities. On 7/10, cultures showed sensitivities to ceftriaxone, so Abx course further narrowed to Ceftriaxone 2g daily. Patient to complete total of 7 day course of IV Ceftriaxone for bacteremia, followed by 3 additional day course of Cefpodoxime for cellulitis of left great toe.     Problem List/Main Diagnoses (system-based):   # Bacteremia  E. Coli Bacteremia on 7/7 blood cultures. Likely secondary to left great toe cellulitis. Initially patient on Zosyn, but given sensitivity to ceftriaxone, patient put on 7 day course of ceftriaxone.   - Repeat blood cultures showing no growth to date.   - Continue 7-day course of IV Abx (7/8-7/14)    # Cellulitis  Cellulitis - E.Coli positive.   - Continue IV antibiotics for bacteremia, followed by 3 day course of cefpodoxime.     Patient was discharged to: [facility]    New medications: Ceftriaxone; cefpodoxime    Changes to old medications: none  Medications that were stopped: none    Items to follow up as outpatient:     Physical exam at the time of discharge:   VITALS:   T(C): 37.7 (07-10-25 @ 10:26), Max: 37.7 (07-10-25 @ 10:26)  HR: 87 (07-10-25 @ 05:45) (87 - 92)  BP: 116/63 (07-10-25 @ 05:45) (109/58 - 116/63)  RR: 18 (07-10-25 @ 05:50) (17 - 18)  SpO2: 93% (07-10-25 @ 05:50) (90% - 94%)    GENERAL: NAD, lying in bed comfortably  HEAD:  Atraumatic, Normocephalic  EYES: EOMI, PERRLA, conjunctiva and sclera clear  ENT: Moist mucous membranes  NECK: Supple, No JVD  CHEST/LUNG: Clear to auscultation bilaterally; No rales, rhonchi, wheezing, or rubs. Unlabored respirations  HEART: Regular rate and rhythm; No murmurs, rubs, or gallops  ABDOMEN: BSx4; Soft, nontender, nondistended  EXTREMITIES:  No clubbing, cyanosis, or edema; L great toe cleaned and wrapped by podiatry  NERVOUS SYSTEM:  A&Ox3, no focal deficits   SKIN: No rashes or lesions      LABS & STUDIES:  LABS:                        11.4   11.79 )-----------( 257      ( 10 Jul 2025 05:30 )             34.8     07-10    131[L]  |  94[L]  |  15  ----------------------------<  174[H]  4.3   |  24  |  0.56    Ca    9.1      10 Jul 2025 05:30  Phos  3.2     07-10  Mg     2.0     07-10        Urinalysis Basic - ( 10 Jul 2025 05:30 )    Color: x / Appearance: x / SG: x / pH: x  Gluc: 174 mg/dL / Ketone: x  / Bili: x / Urobili: x   Blood: x / Protein: x / Nitrite: x   Leuk Esterase: x / RBC: x / WBC x   Sq Epi: x / Non Sq Epi: x / Bacteria: x       #Discharge: do not delete    ROWENA LOYD is a 89yo F with DM, depression, HLD, s/p pacemaker, diabetic neuropathy, c/o infection to left big toe with maggot on it for at least a month. She is unsure when the wound first started. Noted to have L great toe nail avulsion with maggots present in wound. She lived alone up until 1 month ago, but has HHA now 10a-6p daily. Reports she has had increasing difficulty ambulating around her apartment, had a fall 1 wk ago and felt a pop in her right arm, has had decreased ROM since the fall. Reports she had outpatient imaging done without fx or dislocation. No headstrike/LOC. Otherwise denies any fevers, chills, N/V/D, abdominal pain, chest pain or SOB.     Bx: E Coli. CT LLE with IV contrast: No evidence of osteomyelitis and she is currently on Zosyn, Nystatin powder.     Presented with left big toe infection with maggot, found to have E Coli cellulitis of the left great toe and E. Coli bacteremia. CT Scan of the LLE w/ IV contrast was performed without any evidence of osteomyelitis. Patient received 1 dose of vancomycin and 1 dose of Zosyn on 7/7/25. Given culture results, Abx coverage narrowed to Zosyn pending culture sensitivities. On 7/10, cultures showed sensitivities to ceftriaxone, so Abx course further narrowed to Ceftriaxone 2g daily. Patient to complete total of 7 day course of IV Ceftriaxone for bacteremia, followed by 3 additional day course of Cefpodoxime for cellulitis of left great toe.     Problem List/Main Diagnoses (system-based):    # Bacteremia  E. Coli Bacteremia on 7/7 blood cultures. Likely secondary to left great toe cellulitis. Initially patient on Zosyn, but given sensitivity to ceftriaxone, patient put on 7 day course of ceftriaxone.   - Repeat blood cultures showing no growth to date.   - Continue 7-day course of IV Abx (7/8-7/14)    # Cellulitis  Cellulitis - E.Coli positive.   - Continue IV antibiotics for bacteremia, followed by 3 day course of cefpodoxime.     Patient was discharged to: [facility]    New medications: Ceftriaxone; cefpodoxime    Changes to old medications: none  Medications that were stopped: none    Items to follow up as outpatient:     Physical exam at the time of discharge:   VITALS:   T(C): 37.7 (07-10-25 @ 10:26), Max: 37.7 (07-10-25 @ 10:26)  HR: 87 (07-10-25 @ 05:45) (87 - 92)  BP: 116/63 (07-10-25 @ 05:45) (109/58 - 116/63)  RR: 18 (07-10-25 @ 05:50) (17 - 18)  SpO2: 93% (07-10-25 @ 05:50) (90% - 94%)    GENERAL: NAD, lying in bed comfortably  HEAD:  Atraumatic, Normocephalic  EYES: EOMI, PERRLA, conjunctiva and sclera clear  ENT: Moist mucous membranes  NECK: Supple, No JVD  CHEST/LUNG: Clear to auscultation bilaterally; No rales, rhonchi, wheezing, or rubs. Unlabored respirations  HEART: Regular rate and rhythm; No murmurs, rubs, or gallops  ABDOMEN: BSx4; Soft, nontender, nondistended  EXTREMITIES:  No clubbing, cyanosis, or edema; L great toe cleaned and wrapped by podiatry  NERVOUS SYSTEM:  A&Ox3, no focal deficits   SKIN: No rashes or lesions      LABS & STUDIES:  LABS:                        11.4   11.79 )-----------( 257      ( 10 Jul 2025 05:30 )             34.8     07-10    131[L]  |  94[L]  |  15  ----------------------------<  174[H]  4.3   |  24  |  0.56    Ca    9.1      10 Jul 2025 05:30  Phos  3.2     07-10  Mg     2.0     07-10        Urinalysis Basic - ( 10 Jul 2025 05:30 )    Color: x / Appearance: x / SG: x / pH: x  Gluc: 174 mg/dL / Ketone: x  / Bili: x / Urobili: x   Blood: x / Protein: x / Nitrite: x   Leuk Esterase: x / RBC: x / WBC x   Sq Epi: x / Non Sq Epi: x / Bacteria: x       #Discharge: do not delete    ROWENA LOYD is a 89yo F with DM, depression, HLD, s/p pacemaker, diabetic neuropathy, c/o infection to left big toe with maggot on it for at least a month. She is unsure when the wound first started. Noted to have L great toe nail avulsion with maggots present in wound. She lived alone up until 1 month ago, but has HHA now 10a-6p daily. Reports she has had increasing difficulty ambulating around her apartment, had a fall 1 wk ago and felt a pop in her right arm, has had decreased ROM since the fall. Reports she had outpatient imaging done without fx or dislocation. No headstrike/LOC. Otherwise denies any fevers, chills, N/V/D, abdominal pain, chest pain or SOB.     Bx: E Coli. CT LLE with IV contrast: No evidence of osteomyelitis and she is currently on Zosyn, Nystatin powder.     Presented with left big toe infection with maggot, found to have E Coli cellulitis of the left great toe and E. Coli bacteremia. CT Scan of the LLE w/ IV contrast was performed without any evidence of osteomyelitis. Patient received 1 dose of vancomycin and 1 dose of Zosyn on 7/7/25. Given culture results, Abx coverage narrowed to Zosyn pending culture sensitivities. On 7/10, cultures showed sensitivities to ceftriaxone, so Abx course further narrowed to Ceftriaxone 2g daily. Patient to complete total of 7 day course of IV Ceftriaxone for bacteremia, followed by 3 additional day course of Cefpodoxime for cellulitis of left great toe.     Problem List/Main Diagnoses (system-based):    # Bacteremia  E. Coli Bacteremia on 7/7 blood cultures. Likely secondary to left great toe cellulitis. Initially patient on Zosyn, but given sensitivity to ceftriaxone, patient put on 7 day course of ceftriaxone.   - Repeat blood cultures showing no growth to date.   - Continue 7-day course of IV Abx (7/8-7/14)    # Cellulitis  Cellulitis of L great toe with maggots on admission. Possible nail avulsion. Negative PTB, low clinical suspicion for OM. Seen by podiatry inpatient.   - Continue IV antibiotics for bacteremia, followed by 3 day course of cefpodoxime to complete 10 day totals for cellulitis       Discharge dressing instructions: Cleanse wound with normal sailine daily, pat dry with sterile guaze, apply bacitracin gauze to wound base, cover with dry sterile gauze, ABD pad, wrap with Kerlix and light ACE bandage.     Follow up out-patient within 1 week of discharge:  [facility]   Address: [facility] [address] [place] [address]  Phone: [phone]        Patient was discharged to: [facility]    New medications: Ceftriaxone; cefpodoxime    Changes to old medications: none  Medications that were stopped: none    Items to follow up as outpatient:     Physical exam at the time of discharge:   VITALS:   T(C): 37.7 (07-10-25 @ 10:26), Max: 37.7 (07-10-25 @ 10:26)  HR: 87 (07-10-25 @ 05:45) (87 - 92)  BP: 116/63 (07-10-25 @ 05:45) (109/58 - 116/63)  RR: 18 (07-10-25 @ 05:50) (17 - 18)  SpO2: 93% (07-10-25 @ 05:50) (90% - 94%)    GENERAL: NAD, lying in bed comfortably  HEAD:  Atraumatic, Normocephalic  EYES: EOMI, PERRLA, conjunctiva and sclera clear  ENT: Moist mucous membranes  NECK: Supple, No JVD  CHEST/LUNG: Clear to auscultation bilaterally; No rales, rhonchi, wheezing, or rubs. Unlabored respirations  HEART: Regular rate and rhythm; No murmurs, rubs, or gallops  ABDOMEN: BSx4; Soft, nontender, nondistended  EXTREMITIES:  No clubbing, cyanosis, or edema; L great toe cleaned and wrapped by podiatry  NERVOUS SYSTEM:  A&Ox3, no focal deficits   SKIN: No rashes or lesions      LABS & STUDIES:  LABS:                        11.4   11.79 )-----------( 257      ( 10 Jul 2025 05:30 )             34.8     07-10    131[L]  |  94[L]  |  15  ----------------------------<  174[H]  4.3   |  24  |  0.56    Ca    9.1      10 Jul 2025 05:30  Phos  3.2     07-10  Mg     2.0     07-10        Urinalysis Basic - ( 10 Jul 2025 05:30 )    Color: x / Appearance: x / SG: x / pH: x  Gluc: 174 mg/dL / Ketone: x  / Bili: x / Urobili: x   Blood: x / Protein: x / Nitrite: x   Leuk Esterase: x / RBC: x / WBC x   Sq Epi: x / Non Sq Epi: x / Bacteria: x       #Discharge: do not delete    ROWENA LOYD is a 89yo F with DM, depression, HLD, s/p pacemaker, diabetic neuropathy, c/o infection to left big toe with maggot on it for at least a month. She is unsure when the wound first started. Noted to have L great toe nail avulsion with maggots present in wound. She lived alone up until 1 month ago, but has HHA now 10a-6p daily. Reports she has had increasing difficulty ambulating around her apartment, had a fall 1 wk ago and felt a pop in her right arm, has had decreased ROM since the fall. Reports she had outpatient imaging done without fx or dislocation. No headstrike/LOC. Otherwise denies any fevers, chills, N/V/D, abdominal pain, chest pain or SOB.     Bx: E Coli. CT LLE with IV contrast: No evidence of osteomyelitis and she is currently on Zosyn, Nystatin powder.     Presented with left big toe infection with maggot, found to have E Coli cellulitis of the left great toe and E. Coli bacteremia. CT Scan of the LLE w/ IV contrast was performed without any evidence of osteomyelitis. Patient received 1 dose of vancomycin and 1 dose of Zosyn on 7/7/25. Given culture results, Abx coverage narrowed to Zosyn pending culture sensitivities. On 7/10, cultures showed sensitivities to ceftriaxone, so Abx course further narrowed to Ceftriaxone 2g daily. Patient to complete total of 7 day course of IV Ceftriaxone for bacteremia, followed by 3 additional day course of Cefpodoxime for cellulitis of left great toe.     Problem List/Main Diagnoses (system-based):    # Bacteremia  E. Coli Bacteremia on 7/7 blood cultures. Likely secondary to left great toe cellulitis. Initially patient on Zosyn, but given sensitivity to ceftriaxone, patient put on 7 day course of ceftriaxone.   - Repeat blood cultures showing no growth to date.   - Continue 7-day course of IV Ceftriaxone (7/8-7/15; last dose 7/14)    # Cellulitis  Cellulitis of L great toe with maggots on admission. Possible nail avulsion. Negative PTB, low clinical suspicion for OM. Seen by podiatry inpatient.   - Continue IV Ceftriaxone 2g q24 for bacteremia, followed by 3 day course of cefpodoxime 200mg tablet 2x/day to complete 10 day totals for cellulitis       Discharge dressing instructions: Cleanse wound with normal sailine daily, pat dry with sterile guaze, apply bacitracin gauze to wound base, cover with dry sterile gauze, ABD pad, wrap with Kerlix and light ACE bandage.     Follow up out-patient within 1 week of discharge:  [facility]   Address: [address] [place] [address]  Phone: [phone]        Patient was discharged to: [facility]    New medications: Ceftriaxone; cefpodoxime    Changes to old medications: none  Medications that were stopped: none    Items to follow up as outpatient:     Physical exam at the time of discharge:   VITALS:   T(C): 37.7 (07-10-25 @ 10:26), Max: 37.7 (07-10-25 @ 10:26)  HR: 87 (07-10-25 @ 05:45) (87 - 92)  BP: 116/63 (07-10-25 @ 05:45) (109/58 - 116/63)  RR: 18 (07-10-25 @ 05:50) (17 - 18)  SpO2: 93% (07-10-25 @ 05:50) (90% - 94%)    GENERAL: NAD, lying in bed comfortably  HEAD:  Atraumatic, Normocephalic  EYES: EOMI, PERRLA, conjunctiva and sclera clear  ENT: Moist mucous membranes  NECK: Supple, No JVD  CHEST/LUNG: Clear to auscultation bilaterally; No rales, rhonchi, wheezing, or rubs. Unlabored respirations  HEART: Regular rate and rhythm; No murmurs, rubs, or gallops  ABDOMEN: BSx4; Soft, nontender, nondistended  EXTREMITIES:  No clubbing, cyanosis, or edema; L great toe cleaned and wrapped by podiatry  NERVOUS SYSTEM:  A&Ox3, no focal deficits   SKIN: No rashes or lesions      LABS & STUDIES:  LABS:                        11.4   11.79 )-----------( 257      ( 10 Jul 2025 05:30 )             34.8     07-10    131[L]  |  94[L]  |  15  ----------------------------<  174[H]  4.3   |  24  |  0.56    Ca    9.1      10 Jul 2025 05:30  Phos  3.2     07-10  Mg     2.0     07-10        Urinalysis Basic - ( 10 Jul 2025 05:30 )    Color: x / Appearance: x / SG: x / pH: x  Gluc: 174 mg/dL / Ketone: x  / Bili: x / Urobili: x   Blood: x / Protein: x / Nitrite: x   Leuk Esterase: x / RBC: x / WBC x   Sq Epi: x / Non Sq Epi: x / Bacteria: x       #Discharge: do not delete    ROWENA LOYD is a 89yo F with DM, depression, HLD, s/p pacemaker, diabetic neuropathy, c/o infection to left big toe with maggot on it for at least a month. She is unsure when the wound first started. Noted to have L great toe nail avulsion with maggots present in wound. She lived alone up until 1 month ago, but has HHA now 10a-6p daily. Reports she has had increasing difficulty ambulating around her apartment, had a fall 1 wk ago and felt a pop in her right arm, has had decreased ROM since the fall. Reports she had outpatient imaging done without fx or dislocation. No headstrike/LOC. Otherwise denies any fevers, chills, N/V/D, abdominal pain, chest pain or SOB.      Bx: E Coli. CT LLE with IV contrast: No evidence of osteomyelitis and she is currently on Zosyn, Nystatin powder.     Presented with left big toe infection with maggot, found to have E Coli cellulitis of the left great toe and E. Coli bacteremia. CT Scan of the LLE w/ IV contrast was performed without any evidence of osteomyelitis. Patient received 1 dose of vancomycin and 1 dose of Zosyn on 7/7/25. Given culture results, Abx coverage narrowed to Zosyn pending culture sensitivities. On 7/10, cultures showed sensitivities to ceftriaxone, so Abx course further narrowed to Ceftriaxone 2g daily. Patient to complete total of 7 day course of IV Ceftriaxone for bacteremia, followed by 3 additional day course of Cefpodoxime for cellulitis of left great toe.     Problem List/Main Diagnoses (system-based):    # Bacteremia  E. Coli Bacteremia on 7/7 blood cultures. Likely secondary to left great toe cellulitis. Initially patient on Zosyn, but given sensitivity to ceftriaxone, patient put on 7 day course of ceftriaxone.   - Repeat blood cultures showing no growth to date.   - Continue 7-day course of IV Ceftriaxone (7/8-7/15; last dose 7/14)    # Cellulitis  Cellulitis of L great toe with maggots on admission. Possible nail avulsion. Negative PTB, low clinical suspicion for OM. Seen by podiatry inpatient.   - Continue IV Ceftriaxone 2g q24 for bacteremia, followed by 3 day course of cefpodoxime 200mg tablet 2x/day to complete 10 day totals for cellulitis       Discharge dressing instructions: Cleanse wound with normal sailine daily, pat dry with sterile guaze, apply bacitracin gauze to wound base, cover with dry sterile gauze, ABD pad, wrap with Kerlix and light ACE bandage.     Follow up out-patient within 1 week of discharge:  [facility]   Address: [address] [place] [address]  Phone: [phone]        Patient was discharged to: [facility]    New medications: Ceftriaxone; cefpodoxime    Changes to old medications: none  Medications that were stopped: none    Items to follow up as outpatient:     Physical exam at the time of discharge:   VITALS:   T(C): 37.7 (07-10-25 @ 10:26), Max: 37.7 (07-10-25 @ 10:26)  HR: 87 (07-10-25 @ 05:45) (87 - 92)  BP: 116/63 (07-10-25 @ 05:45) (109/58 - 116/63)  RR: 18 (07-10-25 @ 05:50) (17 - 18)  SpO2: 93% (07-10-25 @ 05:50) (90% - 94%)    GENERAL: NAD, lying in bed comfortably  HEAD:  Atraumatic, Normocephalic  EYES: EOMI, PERRLA, conjunctiva and sclera clear  ENT: Moist mucous membranes  NECK: Supple, No JVD  CHEST/LUNG: Clear to auscultation bilaterally; No rales, rhonchi, wheezing, or rubs. Unlabored respirations  HEART: Regular rate and rhythm; No murmurs, rubs, or gallops  ABDOMEN: BSx4; Soft, nontender, nondistended  EXTREMITIES:  No clubbing, cyanosis, or edema; L great toe cleaned and wrapped by podiatry  NERVOUS SYSTEM:  A&Ox3, no focal deficits   SKIN: No rashes or lesions      LABS & STUDIES:  LABS:                        11.4   11.79 )-----------( 257      ( 10 Jul 2025 05:30 )             34.8     07-10    131[L]  |  94[L]  |  15  ----------------------------<  174[H]  4.3   |  24  |  0.56    Ca    9.1      10 Jul 2025 05:30  Phos  3.2     07-10  Mg     2.0     07-10        Urinalysis Basic - ( 10 Jul 2025 05:30 )    Color: x / Appearance: x / SG: x / pH: x  Gluc: 174 mg/dL / Ketone: x  / Bili: x / Urobili: x   Blood: x / Protein: x / Nitrite: x   Leuk Esterase: x / RBC: x / WBC x   Sq Epi: x / Non Sq Epi: x / Bacteria: x

## 2025-07-11 VITALS
SYSTOLIC BLOOD PRESSURE: 111 MMHG | DIASTOLIC BLOOD PRESSURE: 65 MMHG | OXYGEN SATURATION: 96 % | RESPIRATION RATE: 19 BRPM | TEMPERATURE: 98 F | HEART RATE: 85 BPM

## 2025-07-11 LAB
ANION GAP SERPL CALC-SCNC: 10 MMOL/L — SIGNIFICANT CHANGE UP (ref 5–17)
BASOPHILS # BLD AUTO: 0.03 K/UL — SIGNIFICANT CHANGE UP (ref 0–0.2)
BASOPHILS NFR BLD AUTO: 0.3 % — SIGNIFICANT CHANGE UP (ref 0–2)
BUN SERPL-MCNC: 16 MG/DL — SIGNIFICANT CHANGE UP (ref 7–23)
CALCIUM SERPL-MCNC: 8.8 MG/DL — SIGNIFICANT CHANGE UP (ref 8.4–10.5)
CHLORIDE SERPL-SCNC: 96 MMOL/L — SIGNIFICANT CHANGE UP (ref 96–108)
CO2 SERPL-SCNC: 26 MMOL/L — SIGNIFICANT CHANGE UP (ref 22–31)
CREAT SERPL-MCNC: 0.51 MG/DL — SIGNIFICANT CHANGE UP (ref 0.5–1.3)
EGFR: 89 ML/MIN/1.73M2 — SIGNIFICANT CHANGE UP
EGFR: 89 ML/MIN/1.73M2 — SIGNIFICANT CHANGE UP
EOSINOPHIL # BLD AUTO: 0.26 K/UL — SIGNIFICANT CHANGE UP (ref 0–0.5)
EOSINOPHIL NFR BLD AUTO: 2.6 % — SIGNIFICANT CHANGE UP (ref 0–6)
GLUCOSE SERPL-MCNC: 147 MG/DL — HIGH (ref 70–99)
HCT VFR BLD CALC: 35.5 % — SIGNIFICANT CHANGE UP (ref 34.5–45)
HGB BLD-MCNC: 11.7 G/DL — SIGNIFICANT CHANGE UP (ref 11.5–15.5)
IMM GRANULOCYTES # BLD AUTO: 0.04 K/UL — SIGNIFICANT CHANGE UP (ref 0–0.07)
IMM GRANULOCYTES NFR BLD AUTO: 0.4 % — SIGNIFICANT CHANGE UP (ref 0–0.9)
LYMPHOCYTES # BLD AUTO: 1.11 K/UL — SIGNIFICANT CHANGE UP (ref 1–3.3)
LYMPHOCYTES NFR BLD AUTO: 10.9 % — LOW (ref 13–44)
MCHC RBC-ENTMCNC: 28.5 PG — SIGNIFICANT CHANGE UP (ref 27–34)
MCHC RBC-ENTMCNC: 33 G/DL — SIGNIFICANT CHANGE UP (ref 32–36)
MCV RBC AUTO: 86.6 FL — SIGNIFICANT CHANGE UP (ref 80–100)
MONOCYTES # BLD AUTO: 1.27 K/UL — HIGH (ref 0–0.9)
MONOCYTES NFR BLD AUTO: 12.5 % — SIGNIFICANT CHANGE UP (ref 2–14)
NEUTROPHILS # BLD AUTO: 7.46 K/UL — HIGH (ref 1.8–7.4)
NEUTROPHILS NFR BLD AUTO: 73.3 % — SIGNIFICANT CHANGE UP (ref 43–77)
NRBC # BLD AUTO: 0 K/UL — SIGNIFICANT CHANGE UP (ref 0–0)
NRBC # FLD: 0 K/UL — SIGNIFICANT CHANGE UP (ref 0–0)
NRBC BLD AUTO-RTO: 0 /100 WBCS — SIGNIFICANT CHANGE UP (ref 0–0)
PLATELET # BLD AUTO: 254 K/UL — SIGNIFICANT CHANGE UP (ref 150–400)
PMV BLD: 11.4 FL — SIGNIFICANT CHANGE UP (ref 7–13)
POTASSIUM SERPL-MCNC: 4.1 MMOL/L — SIGNIFICANT CHANGE UP (ref 3.5–5.3)
POTASSIUM SERPL-SCNC: 4.1 MMOL/L — SIGNIFICANT CHANGE UP (ref 3.5–5.3)
RBC # BLD: 4.1 M/UL — SIGNIFICANT CHANGE UP (ref 3.8–5.2)
RBC # FLD: 15.6 % — HIGH (ref 10.3–14.5)
SODIUM SERPL-SCNC: 132 MMOL/L — LOW (ref 135–145)
WBC # BLD: 10.17 K/UL — SIGNIFICANT CHANGE UP (ref 3.8–10.5)
WBC # FLD AUTO: 10.17 K/UL — SIGNIFICANT CHANGE UP (ref 3.8–10.5)

## 2025-07-11 PROCEDURE — 83930 ASSAY OF BLOOD OSMOLALITY: CPT

## 2025-07-11 PROCEDURE — 97116 GAIT TRAINING THERAPY: CPT

## 2025-07-11 PROCEDURE — 87070 CULTURE OTHR SPECIMN AEROBIC: CPT

## 2025-07-11 PROCEDURE — 84540 ASSAY OF URINE/UREA-N: CPT

## 2025-07-11 PROCEDURE — 87040 BLOOD CULTURE FOR BACTERIA: CPT

## 2025-07-11 PROCEDURE — 85027 COMPLETE CBC AUTOMATED: CPT

## 2025-07-11 PROCEDURE — 83036 HEMOGLOBIN GLYCOSYLATED A1C: CPT

## 2025-07-11 PROCEDURE — 83735 ASSAY OF MAGNESIUM: CPT

## 2025-07-11 PROCEDURE — 85007 BL SMEAR W/DIFF WBC COUNT: CPT

## 2025-07-11 PROCEDURE — 84300 ASSAY OF URINE SODIUM: CPT

## 2025-07-11 PROCEDURE — 83605 ASSAY OF LACTIC ACID: CPT

## 2025-07-11 PROCEDURE — 86140 C-REACTIVE PROTEIN: CPT

## 2025-07-11 PROCEDURE — 87075 CULTR BACTERIA EXCEPT BLOOD: CPT

## 2025-07-11 PROCEDURE — 99239 HOSP IP/OBS DSCHRG MGMT >30: CPT

## 2025-07-11 PROCEDURE — 85652 RBC SED RATE AUTOMATED: CPT

## 2025-07-11 PROCEDURE — 84100 ASSAY OF PHOSPHORUS: CPT

## 2025-07-11 PROCEDURE — 73070 X-RAY EXAM OF ELBOW: CPT

## 2025-07-11 PROCEDURE — 82962 GLUCOSE BLOOD TEST: CPT

## 2025-07-11 PROCEDURE — 83935 ASSAY OF URINE OSMOLALITY: CPT

## 2025-07-11 PROCEDURE — 87077 CULTURE AEROBIC IDENTIFY: CPT

## 2025-07-11 PROCEDURE — 73701 CT LOWER EXTREMITY W/DYE: CPT

## 2025-07-11 PROCEDURE — 85025 COMPLETE CBC W/AUTO DIFF WBC: CPT

## 2025-07-11 PROCEDURE — 97165 OT EVAL LOW COMPLEX 30 MIN: CPT

## 2025-07-11 PROCEDURE — 82570 ASSAY OF URINE CREATININE: CPT

## 2025-07-11 PROCEDURE — 93970 EXTREMITY STUDY: CPT

## 2025-07-11 PROCEDURE — 99285 EMERGENCY DEPT VISIT HI MDM: CPT

## 2025-07-11 PROCEDURE — 82803 BLOOD GASES ANY COMBINATION: CPT

## 2025-07-11 PROCEDURE — 84145 PROCALCITONIN (PCT): CPT

## 2025-07-11 PROCEDURE — 87205 SMEAR GRAM STAIN: CPT

## 2025-07-11 PROCEDURE — 97161 PT EVAL LOW COMPLEX 20 MIN: CPT

## 2025-07-11 PROCEDURE — 80053 COMPREHEN METABOLIC PANEL: CPT

## 2025-07-11 PROCEDURE — 36415 COLL VENOUS BLD VENIPUNCTURE: CPT

## 2025-07-11 PROCEDURE — 87186 SC STD MICRODIL/AGAR DIL: CPT

## 2025-07-11 PROCEDURE — 84156 ASSAY OF PROTEIN URINE: CPT

## 2025-07-11 PROCEDURE — 73030 X-RAY EXAM OF SHOULDER: CPT

## 2025-07-11 PROCEDURE — 81003 URINALYSIS AUTO W/O SCOPE: CPT

## 2025-07-11 PROCEDURE — 87150 DNA/RNA AMPLIFIED PROBE: CPT

## 2025-07-11 PROCEDURE — 80048 BASIC METABOLIC PNL TOTAL CA: CPT

## 2025-07-11 RX ADMIN — Medication 1 TABLET(S): at 11:15

## 2025-07-11 RX ADMIN — ENOXAPARIN SODIUM 40 MILLIGRAM(S): 100 INJECTION SUBCUTANEOUS at 06:08

## 2025-07-11 RX ADMIN — CEFTRIAXONE 100 MILLIGRAM(S): 500 INJECTION, POWDER, FOR SOLUTION INTRAMUSCULAR; INTRAVENOUS at 10:03

## 2025-07-11 RX ADMIN — NYSTATIN 1 APPLICATION(S): 100000 CREAM TOPICAL at 06:08

## 2025-07-11 RX ADMIN — ESCITALOPRAM OXALATE 20 MILLIGRAM(S): 20 TABLET ORAL at 11:15

## 2025-07-11 RX ADMIN — GABAPENTIN 100 MILLIGRAM(S): 400 CAPSULE ORAL at 11:15

## 2025-07-11 RX ADMIN — GABAPENTIN 200 MILLIGRAM(S): 400 CAPSULE ORAL at 06:08

## 2025-07-11 NOTE — PROGRESS NOTE ADULT - REASON FOR ADMISSION
L great toe cellulitis

## 2025-07-11 NOTE — PROGRESS NOTE ADULT - SUBJECTIVE AND OBJECTIVE BOX
Physical Medicine and Rehabilitation Progress Note :       Patient is a 89y old  Female who presents with a chief complaint of L great toe cellulitis (11 Jul 2025 08:08)      HPI:  89yo F with DM, depression, HLD, s/p pacemaker, diabetic neuropathy, c/o infection to left big toe with maggot on it for at least a month. She is unsure when the wound first started. Noted to have L great toe nail avulsion with maggots present in wound. She lived alone up until 1 month ago, but has HHA now 10a-6p daily. Reports she has had increasing difficulty ambulating around her apartment, had a fall 1 wk ago and felt a pop in her right arm, has had decreased ROM since the fall. Reports she had outpatient imaging done without fx or dislocation. No headstrike/LOC. Otherwise denies any fevers, chills, N/V/D, abdominal pain, chest pain or SOB.     In the ED  Vitals: T 99.6 rectal, HR 99, /67, RR 16 O2 96% on RA  Labs: WBC 10.5, Hb 12, Plts 230, Na 134, K 4.3, CO2 33, Gap 5, Cr 0.65  Imaging: CT LLE with IV contrast: No evidence of osteomyelitis.  Moderate subcutaneous fat stranding/ill-defined fluid, edema and/or cellulitis. No soft tissue gas. No loculated fluid collection seen. Moderate knee joint effusion. Severe knee arthrosis.  Interventions: Zosyn 3.375mg x1, Vanco 1g x1, NS 500cc x1 (08 Jul 2025 04:05)                            11.7   10.17 )-----------( 254      ( 11 Jul 2025 05:30 )             35.5       07-11    132[L]  |  96  |  16  ----------------------------<  147[H]  4.1   |  26  |  0.51    Ca    8.8      11 Jul 2025 05:30  Phos  3.2     07-10  Mg     2.0     07-10      Vital Signs Last 24 Hrs  T(C): 36.6 (11 Jul 2025 05:50), Max: 37.7 (10 Jul 2025 10:26)  T(F): 97.8 (11 Jul 2025 05:50), Max: 99.9 (10 Jul 2025 10:26)  HR: 91 (11 Jul 2025 05:50) (80 - 91)  BP: 129/64 (11 Jul 2025 05:50) (115/68 - 129/64)  BP(mean): --  RR: 18 (11 Jul 2025 05:50) (17 - 18)  SpO2: 96% (11 Jul 2025 05:50) (95% - 97%)    Parameters below as of 11 Jul 2025 05:50  Patient On (Oxygen Delivery Method): room air        MEDICATIONS  (STANDING):  atorvastatin 10 milliGRAM(s) Oral at bedtime  cefTRIAXone   IVPB      cefTRIAXone   IVPB 2000 milliGRAM(s) IV Intermittent every 24 hours  enoxaparin Injectable 40 milliGRAM(s) SubCutaneous every 24 hours  escitalopram 20 milliGRAM(s) Oral daily  gabapentin 200 milliGRAM(s) Oral <User Schedule>  gabapentin 100 milliGRAM(s) Oral daily  insulin lispro (ADMELOG) corrective regimen sliding scale   SubCutaneous Before meals and at bedtime  multivitamin 1 Tablet(s) Oral daily  nystatin Powder 1 Application(s) Topical two times a day  polyethylene glycol 3350 17 Gram(s) Oral every 12 hours  senna 2 Tablet(s) Oral at bedtime    MEDICATIONS  (PRN):      T(C): 36.6 (07-11-25 @ 05:50), Max: 37.7 (07-10-25 @ 10:26)  HR: 91 (07-11-25 @ 05:50) (80 - 91)  BP: 129/64 (07-11-25 @ 05:50) (115/68 - 129/64)  RR: 18 (07-11-25 @ 05:50) (17 - 18)  SpO2: 96% (07-11-25 @ 05:50) (95% - 97%)    Physical Exam:    89 y o woman lying comfortably in semi Pacheco's position , awake , alert , no acute complaints     Head: normocephalic , atraumatic    Eyes: PERRLA , EOMI , no nystagmus , sclera anicteric    ENT / FACE: neg nasal discharge , uvula midline , no oropharyngeal erythema / exudate    Neck: supple , negative JVD , negative carotid bruits , no thyromegaly    Chest: CTA bilaterally     Cardiovascular: regular rate and rhythm , neg murmurs / rubs / gallops    Abdomen: soft , non distended , no tenderness to palpation in all 4 quadrants ,  normal bowel sounds     Extremities:  2-3 + LE edema     Musculoskeletal:  L wrist ganglion cyst     Neurologic Exam:     Alert and oriented to person , place , date/year     Cranial Nerves:           II:                         pupils equal , round and reactive to light , visual fields intact         III/ IV/VI:             extraocular movements intact , neg nystagmus , neg ptosis        V:                        facial sensation intact , V1-3 normal        VII:                      face symmetric , no droop , normal eye closure and smile        VIII:                     hearing intact to finger rub bilaterally        IX and X:             no hoarseness , gag intact , palate/ uvula rise symmetrically        XI:                       SCM / trapezius strength intact bilateral        XII:                      no tongue deviation    Motor Exam:        > 3+/5 x 4 extremities / R shoulder pain limited      Sensation:         intact to light touch x 4 extremities                            no neglect or extinction on double simultaneous testing    DTR:           biceps/brachioradialis: equal                            patella/ankle: equal          neg Babinski      Coordination:            Finger to Nose:  neg dysmetria bilaterally        7/10/2025 Functional Status Assessment :       Pain Assessment/Number Scale (0-10) Adult  Presence of Pain: denies pain/discomfort (Rating = 0)  Pain Rating (0-10): Rest: 0 (no pain/absence of nonverbal indicators of pain)  Pain Rating (0-10): Activity: 0 (no pain/absence of nonverbal indicators of pain)    Safety      AM-PAC Functional Assessment: Basic Mobility  Type of Assessment: Daily assessment  Turning from your back to your side while in a flat bed without using bedrails?: 3 = A little assistance  Moving from lying on your back to sitting on the flat side of a flat bed without using bedrails?: 3 = A little assistance  Moving to and from a bed to a chair (including a wheelchair)?: 2 = A lot of assistance  Standing up from a chair using your arms (e.g. wheelchair or bedside chair)?: 2 = A lot of assistance  Walking in hospital room?: 2 = A lot of assistance  Climbing 3-5 steps with a railing?: 2 = A lot of assistance     Score: 14   Row Comment: Ask the patient "How much help from another person do you currently need? (If the patient hasn't done an activity recently, how much help from another person do you think he/she needs if he/she tried?)    Cognitive/Neuro      Language Assistance  Preferred Language to Address Healthcare Preferred Language to Address Healthcare: English    Therapeutic Interventions      Bed Mobility  Bed Mobility Training Rolling/Turning: contact guard;  verbal cues  Bed Mobility Training Scooting: contact guard;  verbal cues  Bed Mobility Training Sit-to-Supine: not assessed; patient returned seated in bedside chair   Bed Mobility Training Supine-to-Sit: contact guard;  verbal cues;  bed rails  Bed Mobility Training Limitations: impaired ability to control trunk for mobility;  decreased ability to use arms for pushing/pulling;  decreased flexibility;  decreased ROM;  decreased strength;  impaired balance    Sit-Stand Transfer Training  Transfer Training Sit-to-Stand Transfer: moderate assist (50% patient effort);  2 person assist;  verbal cues;  rolling walker;  weight-bearing as tolerated  Transfer Training Stand-to-Sit Transfer: moderate assist (50% patient effort);  2 person assist;  verbal cues;  rolling walker;  weight-bearing as tolerated  Sit-to-Stand Transfer Training Transfer Safety Analysis: decreased balance;  decreased weight-shifting ability;  Demo impaired eccentric control during descend;  decreased flexibility;  decreased ROM;  decreased strength;  impaired balance    Gait Training  Gait Training: minimum assist (75% patient effort);  2 person assist;  verbal cues;  weight-bearing as tolerated   rolling walker;  5 feet;  bed to chair  Gait Analysis: decreased ferdinand;  crouch;  increased time in double stance;  decreased hip/knee flexion;  decreased velocity of limb motion;  shuffling;  decreased step length;  decreased stride length;  decreased toe clearance;  decreased weight-shifting ability;  decreased flexibility;  decreased ROM;  decreased strength;  impaired balance;  Bed to Chair;  5 feet;  rolling walker            PM&R Impression : as above    Current disposition plan recommendation :    subacute rehab placement

## 2025-07-11 NOTE — PROGRESS NOTE ADULT - PROBLEM SELECTOR PLAN 5
Home meds: Simvastatin 10mg qhs    -C/w home med

## 2025-07-11 NOTE — PROGRESS NOTE ADULT - SUBJECTIVE AND OBJECTIVE BOX
89yo F with DM, depression, HLD, s/p pacemaker, diabetic neuropathy, c/o infection to left big toe with maggot on it for at least a month. She is unsure when the wound first started. Noted to have L great toe nail avulsion with maggots present in wound. She lived alone up until 1 month ago, but has HHA now 10a-6p daily. Reports she has had increasing difficulty ambulating around her apartment, had a fall 1 wk ago and felt a pop in her right arm, has had decreased ROM since the fall. Reports she had outpatient imaging done without fx or dislocation. No headstrike/LOC. Otherwise denies any fevers, chills, N/V/D, abdominal pain, chest pain or SOB.   has received Vancomycine i89yo F with DM, depression, HLD, s/p pacemaker, diabetic neuropathy, c/o infection to left big toe with maggot on it for at least a month. She is unsure when the wound first started. Noted to have L great toe nail avulsion with maggots present in wound. She lived alone up until 1 month ago, but has HHA now 10a-6p daily. Reports she has had increasing difficulty ambulating around her apartment, had a fall 1 wk ago and felt a pop in her right arm, has had decreased ROM since the fall. Reports she had outpatient imaging done without fx or dislocation. No headstrike/LOC. Otherwise denies any fevers, chills, N/V/D, abdominal pain, chest pain or SOB.   Bx: E Coli. CT LLE with IV contrast: No evidence of osteomyelitis and she is currently on Zosyn, Nystatin powder.     INTERVAL EVENTS: no significant overnight events. WBC uptrending. Patient not d/c today due to monitoring for other signs of infection. Podiatry not concerned about L big toe being infected.     SUBJECTIVE: Patient endorses getting poor sleep due to lights being on. Denies pain. Reports being able to urinate without discomfort.       Vital Signs Last 24 Hrs  T(C): 36.8 (09 Jul 2025 05:11), Max: 37 (08 Jul 2025 13:00)  T(F): 98.2 (09 Jul 2025 05:11), Max: 98.6 (08 Jul 2025 13:00)  HR: 83 (09 Jul 2025 05:11) (83 - 95)  BP: 131/68 (09 Jul 2025 05:11) (107/53 - 131/76)  BP(mean): 89 (09 Jul 2025 05:11) (71 - 89)  RR: 17 (09 Jul 2025 05:11) (17 - 18)  SpO2: 95% (09 Jul 2025 05:11) (94% - 96%)    Parameters below as of 09 Jul 2025 05:11  Patient On (Oxygen Delivery Method): room air        PHYSICAL EXAM:  General: in no acute distress  Eyes: EOMI intact bilaterally. Anicteric sclerae, moist conjunctivae  HENT: Moist mucous membranes  Neck: Trachea midline, supple  Lungs: CTA B/L. No wheezes, rales, or rhonchi  Cardiovascular: RRR. No murmurs, rubs, or gallops  Abdomen: Soft, non-tender non-distended; No rebound or guarding  Extremities: WWP, No clubbing, cyanosis or edema. L foot wrapped. Cleaned by podiatry today.   Neurological: Alert and oriented  Skin: Warm and dry. No obvious rash       LABS:                        11.2   11.42 )-----------( 254      ( 10 Jul 2025 11:59 )             35.4     07-10    131[L]  |  94[L]  |  15  ----------------------------<  174[H]  4.3   |  24  |  0.56    Ca    9.1      10 Jul 2025 05:30  Phos  3.2     07-10  Mg     2.0     07-10        Urinalysis Basic - ( 10 Jul 2025 05:30 )    Color: x / Appearance: x / SG: x / pH: x  Gluc: 174 mg/dL / Ketone: x  / Bili: x / Urobili: x   Blood: x / Protein: x / Nitrite: x   Leuk Esterase: x / RBC: x / WBC x   Sq Epi: x / Non Sq Epi: x / Bacteria: x

## 2025-07-11 NOTE — PROGRESS NOTE ADULT - ASSESSMENT
89yo F with DM, depression, HLD, s/p pacemaker, diabetic neuropathy presented to the ED with a chronic left great toe wound infested with maggots, present for at least one month. S/p total nail avulsion bedside 7/8. No maggots present after procedure. CT reviewed, no gas, no abscess or fluid collection in foot, no fracture or dislocation. No leukocytosis. ESR elevated to 41. Clinically, low suspicion of OM. Wound culture demonstrated E. Coli. Repeat blood culture no growth for 24 hours. Will treat as SSTI.    Plan  -c/w IV abx  -Foot dressed with betadine, adaptic, kerlix and ACE wrap  -WBAT  -Rest of care up to primary team    Discharge dressing instructions: Cleanse wound with normal sailine daily, pat dry with sterile guaze, apply bacitracin gauze to wound base, cover with dry sterile gauze, ABD pad, wrap with Kerlix and light ACE bandage.     Follow up out-patient within 1 week of discharge:  [facility]   Address: [address] [place] [address]  Phone: [phone]    Podiatry will follow, plan discussed with attending.

## 2025-07-11 NOTE — PROGRESS NOTE ADULT - PROBLEM SELECTOR PLAN 1
Pt presenting wound to left great toe, present >1 month  Patient remains afebrile. Elevated ESR (41) and CRP (29.1). Blood cultures revealed gram negative rods.   Podiatry consulted.  Susceptible to E. Coli    ED COURSE:  Denies any recollection of injury to her toe, although notes fall 1 week ago  Afebrile in ED, HR 90's, WBC 10.5.  CT RLE: No evidence of osteomyelitis.  Fat stranding, ill-defined fluid, edema, and/or cellulitis. No gas, no fluid seen.     -Ceftriaxone 2g q24 for 7 days  - Cefpodoxime for additional 3 days after ceftriaxone course  -Discontinue Cefazolin  -Appreciate podiatry recs  -low concern for MRSA risk factors (no recent hosp, coming from home, reports A1c last month 6.3)

## 2025-07-11 NOTE — PROGRESS NOTE ADULT - PROVIDER SPECIALTY LIST ADULT
Podiatry
Rehab Medicine
Rehab Medicine
Internal Medicine

## 2025-07-11 NOTE — PROGRESS NOTE ADULT - PROBLEM SELECTOR PROBLEM 1
Cellulitis of great toe, left

## 2025-07-11 NOTE — PROGRESS NOTE ADULT - ASSESSMENT
I M    89yo F with DM, depression, HLD, s/p pacemaker, diabetic neuropathy presented to the ED with a chronic left great toe wound infested with maggots, present for at least one month. Also reported a recent fall with right arm pain and decreased range of motion, though prior outpatient imaging showed no fracture or dislocation.       Problem/Plan - 1:  ·  Problem: Cellulitis of great toe, left.   ·  Plan: Pt presenting wound to left great toe, present >1 month  Patient remains afebrile. Elevated ESR (41) and CRP (29.1). Blood cultures revealed gram negative rods.   Podiatry consulted.  Susceptible to E. Coli    ED COURSE:  Denies any recollection of injury to her toe, although notes fall 1 week ago  Afebrile in ED, HR 90's, WBC 10.5.  CT RLE: No evidence of osteomyelitis.  Fat stranding, ill-defined fluid, edema, and/or cellulitis. No gas, no fluid seen.     -Ceftriaxone 2g q24 for 7 days  - Cefpodoxime for additional 3 days after ceftriaxone course  -Discontinue Cefazolin  -Appreciate podiatry recs  -low concern for MRSA risk factors (no recent hosp, coming from home, reports A1c last month 6.3).    Problem/Plan - 2:  ·  Problem: Maggot infestation.   ·  Plan: As above.    Problem/Plan - 3:  ·  Problem: Fall.   ·  Plan: Pt reporting trip and fall 1 week ago at home, denies headstrike/LOC. Felt a pop in her R shoulder with decreased ROM following fall.  R shoulder XR from 5/17: No acute abnormality visualized. Moderate osteoarthritis.    -Repeat shoulder XR  -PT/OT.    Problem/Plan - 4:  ·  Problem: Diabetes.   ·  Plan: Current A1c: Pending  Reports last A1c 1 month ago 6.3, no record in HIE  Home meds: Repaglinide 1mg qd, Metformin-sitagliptan     -mISS.    Problem/Plan - 5:  ·  Problem: Hyperlipidemia.   ·  Plan: Home meds: Simvastatin 10mg qhs    -C/w home med.    Problem/Plan - 6:  ·  Problem: Neuropathy.   ·  Plan: #Restless leg syndrome  Home meds: Recently uptitrated gabapentin 200mg am, 100mg afternoon, 200mg qhs    -C/W home med.    Problem/Plan - 7:  ·  Problem: Overactive bladder.   ·  Plan: Home med mirabegron 50mg qd    -UA w/ reflex  -Non formulary, pt to bring in home med.    Problem/Plan - 8:  ·  Problem: Depression, major.   ·  Plan: Home med escitalopram 20mg qd    -C/W home med.    Problem/Plan - 9:  ·  Problem: Prophylactic measure.   ·  Plan: Fluids: S/P 500cc NS, encourage PO  Electrolytes: Replete PRN  Diet: CC  DVT: Lovenox  Dispo: RMF  Code: DNR/DNI.

## 2025-07-11 NOTE — PROGRESS NOTE ADULT - SUBJECTIVE AND OBJECTIVE BOX
Patient is a 89y old  Female who presents with a chief complaint of L great toe cellulitis (11 Jul 2025 07:32)      INTERVAL HPI/ OVERNIGHT EVENTS   Pt seen and examined bedside. Foot dressing C/D/I. Denies any foot pain. No acute pedal complaints.    LABS                        11.2   11.42 )-----------( 254      ( 10 Jul 2025 11:59 )             35.4     07-10    131[L]  |  94[L]  |  15  ----------------------------<  174[H]  4.3   |  24  |  0.56    Ca    9.1      10 Jul 2025 05:30  Phos  3.2     07-10  Mg     2.0     07-10          ICU Vital Signs Last 24 Hrs  T(C): 36.6 (11 Jul 2025 05:50), Max: 37.7 (10 Jul 2025 10:26)  T(F): 97.8 (11 Jul 2025 05:50), Max: 99.9 (10 Jul 2025 10:26)  HR: 91 (11 Jul 2025 05:50) (80 - 91)  BP: 129/64 (11 Jul 2025 05:50) (115/68 - 129/64)  BP(mean): --  ABP: --  ABP(mean): --  RR: 18 (11 Jul 2025 05:50) (17 - 18)  SpO2: 96% (11 Jul 2025 05:50) (95% - 97%)    O2 Parameters below as of 11 Jul 2025 05:50  Patient On (Oxygen Delivery Method): room air            RADIOLOGY  < from: CT Lower Extremity w/ IV Cont, Left (07.07.25 @ 21:08) >    IMPRESSION:  1.   No evidence of osteomyelitis.  Moderate subcutaneous fat  stranding/ill-defined fluid, edema and/or cellulitis. No soft tissue gas.   No  loculated fluid collection seen.  2.   Moderate knee joint effusion. Severe knee osteoarthritis.    --- End of Report ---    < end of copied text >      MICROBIOLOGY  Culture - Wound Aerobic/Anaerobic (07.08.25 @ 13:23)    Gram Stain:   No polymorphonuclear cells seen per low power field  No organisms seen per oil power field   Specimen Source: Skin/Wound Skin/Wound   Culture Results:   Few Escherichia coli      PHYSICAL EXAM  Lower Extremity Focused  Vasc: 2+ DP/PT L. No edema. No erythema.  Derm: S/p L hallucal total nail avulsion with granular nail bed. No edema. No erythema. No drainage. (-) PTB. No malodor. No fluctuance.  Neuro: Diminished sensation L  MSK: (-) TTP to L hallucal nail bed

## 2025-07-13 LAB
CULTURE RESULTS: ABNORMAL
CULTURE RESULTS: SIGNIFICANT CHANGE UP
ORGANISM # SPEC MICROSCOPIC CNT: ABNORMAL
ORGANISM # SPEC MICROSCOPIC CNT: SIGNIFICANT CHANGE UP
SPECIMEN SOURCE: SIGNIFICANT CHANGE UP
SPECIMEN SOURCE: SIGNIFICANT CHANGE UP

## 2025-07-14 LAB
CULTURE RESULTS: SIGNIFICANT CHANGE UP
SPECIMEN SOURCE: SIGNIFICANT CHANGE UP

## 2025-07-15 RX ORDER — CEFPODOXIME PROXETIL 200 MG/1
1 TABLET, FILM COATED ORAL
Qty: 0 | Refills: 0 | DISCHARGE
Start: 2025-07-15 | End: 2025-07-17

## 2025-07-17 DIAGNOSIS — M19.011 PRIMARY OSTEOARTHRITIS, RIGHT SHOULDER: ICD-10-CM
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
## 2025-07-17 DIAGNOSIS — Z90.49 ACQUIRED ABSENCE OF OTHER SPECIFIED PARTS OF DIGESTIVE TRACT: ICD-10-CM

## 2025-07-17 DIAGNOSIS — F32.9 MAJOR DEPRESSIVE DISORDER, SINGLE EPISODE, UNSPECIFIED: ICD-10-CM

## 2025-07-17 DIAGNOSIS — Z79.4 LONG TERM (CURRENT) USE OF INSULIN: ICD-10-CM
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
## 2025-07-17 DIAGNOSIS — Z66 DO NOT RESUSCITATE: ICD-10-CM

## 2025-07-17 DIAGNOSIS — Z95.0 PRESENCE OF CARDIAC PACEMAKER: ICD-10-CM

## 2025-07-17 DIAGNOSIS — E11.40 TYPE 2 DIABETES MELLITUS WITH DIABETIC NEUROPATHY, UNSPECIFIED: ICD-10-CM

## 2025-07-17 DIAGNOSIS — L03.032 CELLULITIS OF LEFT TOE: ICD-10-CM

## 2025-07-17 DIAGNOSIS — G62.9 POLYNEUROPATHY, UNSPECIFIED: ICD-10-CM

## 2025-07-17 DIAGNOSIS — G25.81 RESTLESS LEGS SYNDROME: ICD-10-CM

## 2025-07-17 DIAGNOSIS — Z79.84 LONG TERM (CURRENT) USE OF ORAL HYPOGLYCEMIC DRUGS: ICD-10-CM
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
## 2025-07-17 DIAGNOSIS — E87.3 ALKALOSIS: ICD-10-CM

## 2025-07-17 DIAGNOSIS — N32.81 OVERACTIVE BLADDER: ICD-10-CM

## 2025-07-17 DIAGNOSIS — Z79.2 LONG TERM (CURRENT) USE OF ANTIBIOTICS: ICD-10-CM

## 2025-07-17 DIAGNOSIS — B87.1 WOUND MYIASIS: ICD-10-CM

## 2025-07-17 DIAGNOSIS — B96.20 UNSPECIFIED ESCHERICHIA COLI [E. COLI] AS THE CAUSE OF DISEASES CLASSIFIED ELSEWHERE: ICD-10-CM

## 2025-07-17 DIAGNOSIS — E78.5 HYPERLIPIDEMIA, UNSPECIFIED: ICD-10-CM

## 2025-07-17 DIAGNOSIS — R78.81 BACTEREMIA: ICD-10-CM
